# Patient Record
Sex: MALE | Race: WHITE | Employment: OTHER | URBAN - METROPOLITAN AREA
[De-identification: names, ages, dates, MRNs, and addresses within clinical notes are randomized per-mention and may not be internally consistent; named-entity substitution may affect disease eponyms.]

---

## 2021-09-13 ENCOUNTER — HOSPITAL ENCOUNTER (OUTPATIENT)
Dept: PREADMISSION TESTING | Age: 66
Discharge: HOME | End: 2021-09-13
Payer: MEDICARE

## 2021-09-13 VITALS
DIASTOLIC BLOOD PRESSURE: 90 MMHG | RESPIRATION RATE: 20 BRPM | BODY MASS INDEX: 27.1 KG/M2 | SYSTOLIC BLOOD PRESSURE: 174 MMHG | HEIGHT: 69 IN | WEIGHT: 182.98 LBS | TEMPERATURE: 98 F | HEART RATE: 76 BPM

## 2021-09-13 LAB
ABO + RH BLD: NORMAL
ANION GAP SERPL CALC-SCNC: 4 MMOL/L (ref 5–15)
APPEARANCE UR: CLEAR
BACTERIA URNS QL MICRO: NEGATIVE /HPF
BILIRUB UR QL: NEGATIVE
BLOOD GROUP ANTIBODIES SERPL: NORMAL
BUN SERPL-MCNC: 16 MG/DL (ref 6–20)
BUN/CREAT SERPL: 13 (ref 12–20)
CALCIUM SERPL-MCNC: 9.4 MG/DL (ref 8.5–10.1)
CHLORIDE SERPL-SCNC: 105 MMOL/L (ref 97–108)
CO2 SERPL-SCNC: 29 MMOL/L (ref 21–32)
COLOR UR: NORMAL
CREAT SERPL-MCNC: 1.19 MG/DL (ref 0.7–1.3)
EPITH CASTS URNS QL MICRO: NORMAL /LPF
ERYTHROCYTE [DISTWIDTH] IN BLOOD BY AUTOMATED COUNT: 13.3 % (ref 11.5–14.5)
EST. AVERAGE GLUCOSE BLD GHB EST-MCNC: 131 MG/DL
GLUCOSE SERPL-MCNC: 90 MG/DL (ref 65–100)
GLUCOSE UR STRIP.AUTO-MCNC: NEGATIVE MG/DL
HBA1C MFR BLD: 6.2 % (ref 4–5.6)
HCT VFR BLD AUTO: 46.9 % (ref 36.6–50.3)
HGB BLD-MCNC: 15.5 G/DL (ref 12.1–17)
HGB UR QL STRIP: NEGATIVE
HYALINE CASTS URNS QL MICRO: NORMAL /LPF (ref 0–5)
INR PPP: 1 (ref 0.9–1.1)
KETONES UR QL STRIP.AUTO: NEGATIVE MG/DL
LEUKOCYTE ESTERASE UR QL STRIP.AUTO: NEGATIVE
MCH RBC QN AUTO: 30.9 PG (ref 26–34)
MCHC RBC AUTO-ENTMCNC: 33 G/DL (ref 30–36.5)
MCV RBC AUTO: 93.6 FL (ref 80–99)
NITRITE UR QL STRIP.AUTO: NEGATIVE
NRBC # BLD: 0 K/UL (ref 0–0.01)
NRBC BLD-RTO: 0 PER 100 WBC
PH UR STRIP: 7 [PH] (ref 5–8)
PLATELET # BLD AUTO: 198 K/UL (ref 150–400)
PMV BLD AUTO: 11 FL (ref 8.9–12.9)
POTASSIUM SERPL-SCNC: 4.4 MMOL/L (ref 3.5–5.1)
PROT UR STRIP-MCNC: NEGATIVE MG/DL
PROTHROMBIN TIME: 10.3 SEC (ref 9–11.1)
RBC # BLD AUTO: 5.01 M/UL (ref 4.1–5.7)
RBC #/AREA URNS HPF: NORMAL /HPF (ref 0–5)
SODIUM SERPL-SCNC: 138 MMOL/L (ref 136–145)
SP GR UR REFRACTOMETRY: 1.01 (ref 1–1.03)
SPECIMEN EXP DATE BLD: NORMAL
UA: UC IF INDICATED,UAUC: NORMAL
UROBILINOGEN UR QL STRIP.AUTO: 0.2 EU/DL (ref 0.2–1)
WBC # BLD AUTO: 8.9 K/UL (ref 4.1–11.1)
WBC URNS QL MICRO: NORMAL /HPF (ref 0–4)

## 2021-09-13 PROCEDURE — 83036 HEMOGLOBIN GLYCOSYLATED A1C: CPT

## 2021-09-13 PROCEDURE — 93005 ELECTROCARDIOGRAM TRACING: CPT

## 2021-09-13 PROCEDURE — 81001 URINALYSIS AUTO W/SCOPE: CPT

## 2021-09-13 PROCEDURE — 36415 COLL VENOUS BLD VENIPUNCTURE: CPT

## 2021-09-13 PROCEDURE — 85027 COMPLETE CBC AUTOMATED: CPT

## 2021-09-13 PROCEDURE — 86901 BLOOD TYPING SEROLOGIC RH(D): CPT

## 2021-09-13 PROCEDURE — 85610 PROTHROMBIN TIME: CPT

## 2021-09-13 PROCEDURE — 80048 BASIC METABOLIC PNL TOTAL CA: CPT

## 2021-09-13 RX ORDER — LEVOTHYROXINE SODIUM 50 UG/1
50 TABLET ORAL
COMMUNITY

## 2021-09-13 RX ORDER — METFORMIN HYDROCHLORIDE 1000 MG/1
TABLET, FILM COATED, EXTENDED RELEASE ORAL
COMMUNITY
End: 2021-09-13

## 2021-09-13 RX ORDER — CARVEDILOL 12.5 MG/1
12.5 TABLET ORAL DAILY
COMMUNITY

## 2021-09-13 RX ORDER — CARVEDILOL 25 MG/1
25 TABLET ORAL
COMMUNITY

## 2021-09-13 RX ORDER — METFORMIN HYDROCHLORIDE 500 MG/1
1000 TABLET ORAL
COMMUNITY

## 2021-09-13 RX ORDER — AMLODIPINE BESYLATE 5 MG/1
5 TABLET ORAL DAILY
COMMUNITY

## 2021-09-13 RX ORDER — DOXAZOSIN 2 MG/1
2 TABLET ORAL
COMMUNITY

## 2021-09-13 RX ORDER — LISINOPRIL 20 MG/1
20 TABLET ORAL
Status: ON HOLD | COMMUNITY
End: 2021-09-20

## 2021-09-13 RX ORDER — ASPIRIN 81 MG/1
81 TABLET ORAL DAILY
COMMUNITY
End: 2021-09-21

## 2021-09-13 RX ORDER — LINAGLIPTIN 5 MG/1
5 TABLET, FILM COATED ORAL
COMMUNITY

## 2021-09-13 RX ORDER — LISINOPRIL 10 MG/1
10 TABLET ORAL DAILY
Status: ON HOLD | COMMUNITY
End: 2021-09-20

## 2021-09-13 RX ORDER — LISINOPRIL AND HYDROCHLOROTHIAZIDE 20; 25 MG/1; MG/1
1 TABLET ORAL
COMMUNITY

## 2021-09-13 NOTE — H&P
Cassandra Layton  : 1955  Single / Language: [place] / Race: White  Male      History of Present Illness  The patient is a 78year old male who presents to the practice today for a transition into care. Additional reasons for visit:    Hip Pain is described as the following: The onset of the hip pain has been gradual and has been occurring in a persistent pattern for 3 years. The course has been gradually worsening. The hip pain is described as a severe sharp stabbing. The hip pain is described as being located in the hip (right). The pain is aggravated by lifting, bending and squatting. Relieving factors include rest. Note for \"Hip Pain\": Chief complaint right hip pain. 3 years of hip pain. Progressively worsening. Pain in his groin. Loss of ability to get shoes and socks on. Patient is here today to schedule his hip replacement. He is seen 2 other doctors. Allergies   No Known Allergies   [2021]:  No Known Drug Allergies   [2021]: Allergies Reconciled      Family History  Cancer   Mother. Heart disease in male family member before age 54      Social History  Caffeine use   3-4 drinks per day, Coffee. Current work status   Unemployed, not looking for work. Exercise   3-4 times per week, walking. Marital status   Single. No alcohol use    Seat Belt Use   Always uses seat belts. Sun Exposure   Frequently. Tobacco / smoke exposure   None. Tobacco use   Current every day smoker, Smokes 1 pack of cigarettes per day. Medication History   amLODIPine Besylate  (5MG Tablet, Oral) Active. Aspirin  (81MG Tablet DR, Oral) Active. Carvedilol  (25MG Tablet, Oral) Active. Combivent Respimat  (20-100MCG/ACT Aerosol Soln, Inhalation) Active. Doxazosin Mesylate  (2MG Tablet, Oral) Active. FiberCon  (625MG Tablet, Oral) Active. Levothyroxine Sodium  (50MCG Tablet, Oral) Active. Lisinopril  (20MG Tablet, Oral) Active.   metFORMIN HCl  (1000MG Tablet, Oral) Active. Lisinopril-hydroCHLOROthiazide  (20-25MG Tablet, Oral) Active. Naproxen  (250MG Tablet, Oral) Active. Sildenafil Citrate  (100MG Tablet, Oral) Active. Tradjenta  (5MG Tablet, Oral) Active. Simvastatin  (5MG Tablet, Oral) Active. Medications Reconciled     Past Surgical History  No pertinent past surgical history      Other Problems   Arthritis    Asthma    Diabetes Mellitus    High blood pressure          Review of Systems  General Not Present- Appetite Loss, Chills, Fatigue, Fever, Night Sweats, Weight Gain and Weight Loss. Skin Not Present- Itching, Nail Changes, Poor Wound Healing, Rash, Skin Color Changes, Suspicious Lesions and Yellowish Skin Color. HEENT Not Present- Decreased Hearing, Earache, Hoarseness, Loose Teeth, Nose Bleed, Ringing in the Ears and Sore Throat. Respiratory Present- Wheezing. Not Present- Bloody sputum, Chronic Cough, Difficulty Breathing, Snoring and Wakes up from Sleep Wheezing or Short of Breath. Cardiovascular Present- Difficulty Breathing On Exertion. Not Present- Bluish Discoloration Of Lips Or Nails, Chest Pain, Difficulty Breathing Lying Down, Leg Cramps With Exertion, Palpitations and Swelling of Extremities. Musculoskeletal Present- Joint Pain. Not Present- Back Pain, Joint Stiffness and Joint Swelling. Neurological Not Present- Fainting, Headaches, Memory Loss, Numbness, Seizures, Tingling, Tremor, Unsteadiness and Weakness. Psychiatric Not Present- Anxiety, Bipolar and Depression. Endocrine Not Present- Cold Intolerance, Excessive Hunger, Excessive Thirst, Excessive Urination and Heat Intolerance. Hematology Present- Abnormal Bruising . Not Present- Enlarged Lymph Nodes, Excessive bleeding and Skin Discoloration. Vitals  Weight: 190 lb   Height: 69 in   Weight was reported by patient. Height was reported by patient.   Body Surface Area: 2.02 m²   Body Mass Index: 28.06 kg/m²      Physical Exam  Musculoskeletal  Global Assessment  Right Lower Extremity - Note: Hip was examined. Positive logroll test and positive impingement test. Hip extends fully flexes to 85 degrees. Very painful to any attempted rotation. Straight leg raise and femoral nerve stretch test are negative. Extremity is sensate and perfused with palpable dorsalis pedis pulse. Hip flexors, quads, ankle plantar flexors, ankle dorsiflexors have 5 out of 5 strength. Left Lower Extremity - Note: Patient's hip was examined. Impingement and apprehension signs are negative. Hip extends fully. Hip flexes to 100+ degrees. There is no trochanteric tenderness. Straight leg raise and femoral nerve stretch test are negative. Extremity is sensate and perfused with palpable dorsalis pedis pulse. Hip flexors, quads, ankle plantar flexors, ankle dorsiflexors have 5 out of 5 strength. Assessment & Plan     Primary osteoarthritis of right hip (715.15  M16.11)  Impression: Severe bone-on-bone DJD of the hip. Options include surgery versus pain management. He has agreed to quit smoking. Longstanding history. He scheduled a right total hip. Current Plans  Pt Education - How to [address] using Patient Portal and "[employer] Party Apps: discussed with patient and provided information. Pt Education - Educational materials were provided.: discussed with patient and provided information. EDUCATION ABOUT SMOKING CESSATION BY ONE ON ONE INSTRUCTION (4000F)  Pt Education - Smoking: Ways to Quit: smoking  X-RAY EXAM OF HIP COMPLETE min 2 VIEWS (57780) (AP Pelvis and Right Lateral hip views were taken today using Digital Radiography. Two views right hip.  Severe bone-on-bone from hip dysplasia with cysts and osteophytes.)    REVIEW OF SYSTEMS: Systems were reviewed by the provider.(V49.9)

## 2021-09-13 NOTE — PERIOP NOTES
Preoperative instructions reviewed with patient. Patient given CHG soap and instructions in use. Patient given SSI infection FAQ sheet. MRSA/MSSA treatment instruction sheet given with an explanation to patient that they will be notified if treatment instructions need to be initiated. Patient was given opportunity to ask questions on the information provided.

## 2021-09-14 ENCOUNTER — TRANSCRIBE ORDER (OUTPATIENT)
Dept: REGISTRATION | Age: 66
End: 2021-09-14

## 2021-09-14 DIAGNOSIS — Z01.812 PRE-PROCEDURE LAB EXAM: Primary | ICD-10-CM

## 2021-09-14 LAB
ATRIAL RATE: 60 BPM
CALCULATED P AXIS, ECG09: 56 DEGREES
CALCULATED R AXIS, ECG10: -36 DEGREES
CALCULATED T AXIS, ECG11: 64 DEGREES
DIAGNOSIS, 93000: NORMAL
P-R INTERVAL, ECG05: 148 MS
Q-T INTERVAL, ECG07: 422 MS
QRS DURATION, ECG06: 86 MS
QTC CALCULATION (BEZET), ECG08: 422 MS
VENTRICULAR RATE, ECG03: 60 BPM

## 2021-09-14 NOTE — PERIOP NOTES
PAT Nurse Practitioner   Pre-Operative Chart Review/Assessment:-ORTHOPEDIC                Patient Name:  Vasyl Murrieta                                                           Age:   78 y.o.    :  1955     Today's Date:  2021     Date of PAT:   2021      Date of Surgery:    2021      Procedure(s):  Right Total Hip Arthroplasty     Surgeon:   Dr. Deya Escalante                       PLAN:      1)  Medical Clearance:  Dr. Demetria Senior      2)  Cardiac Clearance:  EKG and METs reviewed. No further cardiac evaluation requested. 3)  Diabetic Treatment Consult:  Not indicated. A1c-6.2      4)  Sleep Apnea evaluation:   BRETT score of 5. Pt reports daytime fatigue and a diagnosis of HTN. Pt denies loud snoring that can be heard through a closed door and witnessed pauses in breathing. Pt refused referral to sleep medicine at this time. 5) Treatment for MRSA/Staph Aureus:  +MSSA. Tx w/ mupirocin. 6) Additional Concerns:  Current 0.75 PPD smoker, HTN, T2DM, Asthma                Vital Signs:         Vitals:    21 1121   BP: (!) 174/90   Pulse: 76   Resp: 20   Temp: 98 °F (36.7 °C)   Weight: 83 kg (182 lb 15.7 oz)   Height: 5' 9\" (1.753 m)            ____________________________________________  PAST MEDICAL HISTORY  Past Medical History:   Diagnosis Date    Arthritis     Asthma     Diabetes ([address]     Hypertension     MSSA (methicillin susceptible Staphylococcus aureus) 2021    nares      ____________________________________________  PAST SURGICAL HISTORY  Past Surgical History:   Procedure Laterality Date    HX GI      COLONOSCOPY      ____________________________________________  HOME MEDICATIONS  Current Outpatient Medications   Medication Sig    amLODIPine (NORVASC) 5 mg tablet Take 5 mg by mouth daily.  aspirin delayed-release 81 mg tablet Take 81 mg by mouth daily.  carvediloL (COREG) 25 mg tablet Take 25 mg by mouth nightly.     carvediloL (Coreg) 12.5 mg tablet Take 12.5 mg by mouth daily.  ipratropium-albuteroL (Combivent Respimat)  mcg/actuation inhaler Take 1 Puff by inhalation daily. AND AS NEEDED    doxazosin (CARDURA) 2 mg tablet Take 2 mg by mouth nightly.  levothyroxine (SYNTHROID) 50 mcg tablet Take 50 mcg by mouth nightly.  lisinopriL (PRINIVIL, ZESTRIL) 20 mg tablet Take 20 mg by mouth nightly.  lisinopriL (PRINIVIL, ZESTRIL) 10 mg tablet Take 10 mg by mouth daily.  lisinopril-hydroCHLOROthiazide (PRINZIDE, ZESTORETIC) 20-25 mg per tablet Take  by mouth nightly.  linaGLIPtin (Tradjenta) 5 mg tablet Take 5 mg by mouth nightly.  metFORMIN (GLUCOPHAGE) 500 mg tablet Take 1,000 mg by mouth nightly.  mupirocin (BACTROBAN) 2 % ointment by Both Nostrils route two (2) times a day for 7 days. Apply 0.25 g (small pea-sized amount) to both nostrils twice a day for seven days.      No current facility-administered medications for this encounter.      ____________________________________________  ALLERGIES  No Known Allergies   ____________________________________________  SOCIAL HISTORY  Social History     Tobacco Use    Smoking status: Current Every Day Smoker     Packs/day: 0.75     Years: 20.00     Pack years: 15.00    Smokeless tobacco: Never Used   Substance Use Topics    Alcohol use: Not Currently      ____________________________________________   Internal Administration   First Dose COVID-19, PFIZER, MRNA, LNP-S, PF, 30MCG/0.3ML DOSE  02/27/2021   Second Dose COVID-19, PFIZER, MRNA, LNP-S, PF, 30MCG/0.3ML DOSE  03/20/2021      Last COVID Lab SARS-CoV-2 ( )   Date Value   09/15/2021 Not detected        Labs:     Hospital Outpatient Visit on 09/13/2021   Component Date Value Ref Range Status    Sodium 09/13/2021 138  136 - 145 mmol/L Final    Potassium 09/13/2021 4.4  3.5 - 5.1 mmol/L Final    Chloride 09/13/2021 105  97 - 108 mmol/L Final    CO2 09/13/2021 29  21 - 32 mmol/L Final    Anion gap 09/13/2021 4* 5 - 15 mmol/L Final    Glucose 09/13/2021 90  65 - 100 mg/dL Final    BUN 09/13/2021 16  6 - 20 MG/DL Final    Creatinine 09/13/2021 1.19  0.70 - 1.30 MG/DL Final    BUN/Creatinine ratio 09/13/2021 13  12 - 20   Final    GFR est AA 09/13/2021 >60  >60 ml/min/1.73m2 Final    GFR est non-AA 09/13/2021 >60  >60 ml/min/1.73m2 Final    Estimated GFR is calculated using the IDMS-traceable Modification of Diet in Renal Disease (MDRD) Study equation, reported for both  Americans (GFRAA) and non- Americans (GFRNA), and normalized to 1.73m2 body surface area. The physician must decide which value applies to the patient.  Calcium 09/13/2021 9.4  8.5 - 10.1 MG/DL Final    WBC 09/13/2021 8.9  4.1 - 11.1 K/uL Final    RBC 09/13/2021 5.01  4.10 - 5.70 M/uL Final    HGB 09/13/2021 15.5  12.1 - 17.0 g/dL Final    HCT 09/13/2021 46.9  36.6 - 50.3 % Final    MCV 09/13/2021 93.6  80.0 - 99.0 FL Final    MCH 09/13/2021 30.9  26.0 - 34.0 PG Final    MCHC 09/13/2021 33.0  30.0 - 36.5 g/dL Final    RDW 09/13/2021 13.3  11.5 - 14.5 % Final    PLATELET 30/09/2960 232  150 - 400 K/uL Final    MPV 09/13/2021 11.0  8.9 - 12.9 FL Final    NRBC 09/13/2021 0.0  0  WBC Final    ABSOLUTE NRBC 09/13/2021 0.00  0.00 - 0.01 K/uL Final    Crossmatch Expiration 09/13/2021 09/23/2021,2359   Final    ABO/Rh(D) 09/13/2021 A POSITIVE   Final    Antibody screen 09/13/2021 NEG   Final    INR 09/13/2021 1.0  0.9 - 1.1   Final    A single therapeutic range for Vit K antagonists may not be optimal for all indications - see June, 2008 issue of Chest, American College of Chest Physicians Evidence-Based Clinical Practice Guidelines, 8th Edition.     Prothrombin time 09/13/2021 10.3  9.0 - 11.1 sec Final    Color 09/13/2021 YELLOW/STRAW    Final    Color Reference Range: Straw, Yellow or Dark Yellow    Appearance 09/13/2021 CLEAR  CLEAR   Final    Specific gravity 09/13/2021 1.010  1.003 - 1.030   Final    pH (UA) 09/13/2021 7.0 5.0 - 8.0   Final    Protein 09/13/2021 Negative  NEG mg/dL Final    Glucose 09/13/2021 Negative  NEG mg/dL Final    Ketone 09/13/2021 Negative  NEG mg/dL Final    Bilirubin 09/13/2021 Negative  NEG   Final    Blood 09/13/2021 Negative  NEG   Final    Urobilinogen 09/13/2021 0.2  0.2 - 1.0 EU/dL Final    Nitrites 09/13/2021 Negative  NEG   Final    Leukocyte Esterase 09/13/2021 Negative  NEG   Final    UA:UC IF INDICATED 09/13/2021 CULTURE NOT INDICATED BY UA RESULT  CNI   Final    WBC 09/13/2021 0-4  0 - 4 /hpf Final    RBC 09/13/2021 0-5  0 - 5 /hpf Final    Epithelial cells 09/13/2021 FEW  FEW /lpf Final    Epithelial cell category consists of squamous cells and /or transitional urothelial cells. Renal tubular cells, if present, are separately identified as such.     Bacteria 09/13/2021 Negative  NEG /hpf Final    Hyaline cast 09/13/2021 0-2  0 - 5 /lpf Final    Ventricular Rate 09/13/2021 60  BPM Final    Atrial Rate 09/13/2021 60  BPM Final    P-R Interval 09/13/2021 148  ms Final    QRS Duration 09/13/2021 86  ms Final    Q-T Interval 09/13/2021 422  ms Final    QTC Calculation (Bezet) 09/13/2021 422  ms Final    Calculated P Axis 09/13/2021 56  degrees Final    Calculated R Axis 09/13/2021 -36  degrees Final    Calculated T Axis 09/13/2021 64  degrees Final    Diagnosis 09/13/2021    Final                    Value:Sinus rhythm with premature atrial complexes with aberrant conduction  Left axis deviation  Abnormal ECG  No previous ECGs available  Confirmed by Dayan Weeks MD. ([id number]) on 9/14/2021 10:19:01 PM      Hemoglobin A1c 09/13/2021 6.2* 4.0 - 5.6 % Final    Comment: NEW METHOD  PLEASE NOTE NEW REFERENCE RANGE  (NOTE)  HbA1C Interpretive Ranges  <5.7              Normal  5.7 - 6.4         Consider Prediabetes  >6.5              Consider Diabetes      Est. average glucose 09/13/2021 131  mg/dL Final    Special Requests: 09/13/2021 NO SPECIAL REQUESTS    Final    Culture result: 09/13/2021 MRSA NOT PRESENT. Apparent Staphylococus aureus (not MRSA noted). Final       Skin:     Denies open wounds, cuts, sores, rashes or other areas of concern in PAT assessment.           Cha Baig NP

## 2021-09-15 ENCOUNTER — HOSPITAL ENCOUNTER (OUTPATIENT)
Dept: PREADMISSION TESTING | Age: 66
Discharge: HOME | End: 2021-09-15
Payer: MEDICARE

## 2021-09-15 DIAGNOSIS — Z01.812 PRE-PROCEDURE LAB EXAM: ICD-10-CM

## 2021-09-15 LAB
BACTERIA SPEC CULT: NORMAL
BACTERIA SPEC CULT: NORMAL
SERVICE CMNT-IMP: NORMAL

## 2021-09-15 PROCEDURE — U0005 INFEC AGEN DETEC AMPLI PROBE: HCPCS

## 2021-09-16 LAB
SARS-COV-2, XPLCVT: NOT DETECTED
SOURCE, COVRS: NORMAL

## 2021-09-16 RX ORDER — MUPIROCIN 20 MG/G
OINTMENT TOPICAL 2 TIMES DAILY
Qty: 22 G | Refills: 0 | Status: SHIPPED | OUTPATIENT
Start: 2021-09-16 | End: 2021-09-23

## 2021-09-16 NOTE — PERIOP NOTES
PC to pt, full name and  verified, regarding positive nasal cx (MSSA) and need to start Mupirocin ointment BID x 7 days to B nostrils starting today and bathe with CHG soap for 7 days prior to surgery. Pt verbalized understanding of instructions and will start today as recommended. Allergies and pharmacy of choice reviewed. Rx escribed to pt's pharmacy of choice. PTA medlist updated. Surgeon and PCP notified of positive culture and treatment. PRESCRIPTION:    MUPIROCIN 2% OINTMENT  QUANTITY:  #22 GRAMS  REFILLS: NONE    Apply 0.25 g (small pea-sized amount) to both nostrils twice a day for seven days.     Mckinley Dillard, NP

## 2021-09-17 ENCOUNTER — ANESTHESIA EVENT (OUTPATIENT)
Dept: SURGERY | Age: 66
End: 2021-09-17
Payer: MEDICARE

## 2021-09-20 ENCOUNTER — ANESTHESIA (OUTPATIENT)
Dept: SURGERY | Age: 66
End: 2021-09-20
Payer: MEDICARE

## 2021-09-20 ENCOUNTER — HOSPITAL ENCOUNTER (OUTPATIENT)
Age: 66
Discharge: HOME | End: 2021-09-21
Attending: ORTHOPAEDIC SURGERY | Admitting: ORTHOPAEDIC SURGERY
Payer: MEDICARE

## 2021-09-20 ENCOUNTER — APPOINTMENT (OUTPATIENT)
Dept: GENERAL RADIOLOGY | Age: 66
End: 2021-09-20
Attending: ORTHOPAEDIC SURGERY
Payer: MEDICARE

## 2021-09-20 DIAGNOSIS — M16.11 PRIMARY LOCALIZED OSTEOARTHRITIS OF RIGHT HIP: Primary | ICD-10-CM

## 2021-09-20 LAB
GLUCOSE BLD STRIP.AUTO-MCNC: 101 MG/DL (ref 65–117)
GLUCOSE BLD STRIP.AUTO-MCNC: 125 MG/DL (ref 65–117)
GLUCOSE BLD STRIP.AUTO-MCNC: 137 MG/DL (ref 65–117)
GLUCOSE BLD STRIP.AUTO-MCNC: 164 MG/DL (ref 65–117)
SERVICE CMNT-IMP: ABNORMAL
SERVICE CMNT-IMP: NORMAL

## 2021-09-20 PROCEDURE — 74011000250 HC RX REV CODE- 250: Performed by: ORTHOPAEDIC SURGERY

## 2021-09-20 PROCEDURE — 76060000035 HC ANESTHESIA 2 TO 2.5 HR: Performed by: ORTHOPAEDIC SURGERY

## 2021-09-20 PROCEDURE — 77030026438 HC STYL ET INTUB CARD -A: Performed by: NURSE ANESTHETIST, CERTIFIED REGISTERED

## 2021-09-20 PROCEDURE — 2709999900 HC NON-CHARGEABLE SUPPLY: Performed by: ORTHOPAEDIC SURGERY

## 2021-09-20 PROCEDURE — 74011250636 HC RX REV CODE- 250/636: Performed by: ORTHOPAEDIC SURGERY

## 2021-09-20 PROCEDURE — 74011250637 HC RX REV CODE- 250/637: Performed by: ORTHOPAEDIC SURGERY

## 2021-09-20 PROCEDURE — 76010000171 HC OR TIME 2 TO 2.5 HR INTENSV-TIER 1: Performed by: ORTHOPAEDIC SURGERY

## 2021-09-20 PROCEDURE — C1776 JOINT DEVICE (IMPLANTABLE): HCPCS | Performed by: ORTHOPAEDIC SURGERY

## 2021-09-20 PROCEDURE — 97161 PT EVAL LOW COMPLEX 20 MIN: CPT

## 2021-09-20 PROCEDURE — 77030020800 HC BIT DRL QC ZIMM -B: Performed by: ORTHOPAEDIC SURGERY

## 2021-09-20 PROCEDURE — 74011250636 HC RX REV CODE- 250/636: Performed by: NURSE ANESTHETIST, CERTIFIED REGISTERED

## 2021-09-20 PROCEDURE — 74011250637 HC RX REV CODE- 250/637: Performed by: ANESTHESIOLOGY

## 2021-09-20 PROCEDURE — 74011250637 HC RX REV CODE- 250/637: Performed by: PHYSICIAN ASSISTANT

## 2021-09-20 PROCEDURE — 74011000250 HC RX REV CODE- 250: Performed by: NURSE ANESTHETIST, CERTIFIED REGISTERED

## 2021-09-20 PROCEDURE — 82962 GLUCOSE BLOOD TEST: CPT

## 2021-09-20 PROCEDURE — 74011000250 HC RX REV CODE- 250: Performed by: PHYSICIAN ASSISTANT

## 2021-09-20 PROCEDURE — 77030006822 HC BLD SAW SAG BRSM -B: Performed by: ORTHOPAEDIC SURGERY

## 2021-09-20 PROCEDURE — 77030003756 HC BIT DRL DISP ZIMM -B: Performed by: ORTHOPAEDIC SURGERY

## 2021-09-20 PROCEDURE — 74011250636 HC RX REV CODE- 250/636: Performed by: PHYSICIAN ASSISTANT

## 2021-09-20 PROCEDURE — 97116 GAIT TRAINING THERAPY: CPT

## 2021-09-20 PROCEDURE — 73501 X-RAY EXAM HIP UNI 1 VIEW: CPT

## 2021-09-20 PROCEDURE — 74011250636 HC RX REV CODE- 250/636: Performed by: ANESTHESIOLOGY

## 2021-09-20 PROCEDURE — 77030031139 HC SUT VCRL2 J&J -A: Performed by: ORTHOPAEDIC SURGERY

## 2021-09-20 PROCEDURE — 77030039267 HC ADH SKN EXOFIN S2SG -B: Performed by: ORTHOPAEDIC SURGERY

## 2021-09-20 PROCEDURE — 77030002933 HC SUT MCRYL J&J -A: Performed by: ORTHOPAEDIC SURGERY

## 2021-09-20 PROCEDURE — C9290 INJ, BUPIVACAINE LIPOSOME: HCPCS | Performed by: ORTHOPAEDIC SURGERY

## 2021-09-20 PROCEDURE — 76210000001 HC OR PH I REC 2.5 TO 3 HR: Performed by: ORTHOPAEDIC SURGERY

## 2021-09-20 PROCEDURE — 97530 THERAPEUTIC ACTIVITIES: CPT

## 2021-09-20 PROCEDURE — 77030020788: Performed by: ORTHOPAEDIC SURGERY

## 2021-09-20 PROCEDURE — 77030008684 HC TU ET CUF COVD -B: Performed by: NURSE ANESTHETIST, CERTIFIED REGISTERED

## 2021-09-20 PROCEDURE — 74011000258 HC RX REV CODE- 258: Performed by: ORTHOPAEDIC SURGERY

## 2021-09-20 DEVICE — BIOLOX® DELTA, CERAMIC FEMORAL HEAD, XL, Ø 36/+7, TAPER 12/14
Type: IMPLANTABLE DEVICE | Site: HIP | Status: FUNCTIONAL
Brand: BIOLOX® DELTA

## 2021-09-20 DEVICE — IMPLANTABLE DEVICE
Type: IMPLANTABLE DEVICE | Site: HIP | Status: FUNCTIONAL
Brand: G7® ACETABULAR SYSTEM

## 2021-09-20 DEVICE — IMPLANTABLE DEVICE
Type: IMPLANTABLE DEVICE | Site: HIP | Status: FUNCTIONAL
Brand: G7® VIVACIT-E®

## 2021-09-20 DEVICE — IMPLANTABLE DEVICE: Type: IMPLANTABLE DEVICE | Site: HIP | Status: FUNCTIONAL

## 2021-09-20 DEVICE — IMPLANTABLE DEVICE
Type: IMPLANTABLE DEVICE | Site: HIP | Status: FUNCTIONAL
Brand: AVENIR COMPLETE™

## 2021-09-20 DEVICE — HIP H2 ADV OTHER HD IMPL CAPPED H2: Type: IMPLANTABLE DEVICE | Site: HIP | Status: FUNCTIONAL

## 2021-09-20 RX ORDER — SODIUM CHLORIDE, SODIUM LACTATE, POTASSIUM CHLORIDE, CALCIUM CHLORIDE 600; 310; 30; 20 MG/100ML; MG/100ML; MG/100ML; MG/100ML
125 INJECTION, SOLUTION INTRAVENOUS CONTINUOUS
Status: DISCONTINUED | OUTPATIENT
Start: 2021-09-20 | End: 2021-09-20 | Stop reason: HOSPADM

## 2021-09-20 RX ORDER — TRANEXAMIC ACID 100 MG/ML
INJECTION, SOLUTION INTRAVENOUS AS NEEDED
Status: DISCONTINUED | OUTPATIENT
Start: 2021-09-20 | End: 2021-09-20 | Stop reason: HOSPADM

## 2021-09-20 RX ORDER — HYDROXYZINE HYDROCHLORIDE 10 MG/1
10 TABLET, FILM COATED ORAL
Status: DISCONTINUED | OUTPATIENT
Start: 2021-09-20 | End: 2021-09-21 | Stop reason: HOSPADM

## 2021-09-20 RX ORDER — MORPHINE SULFATE 2 MG/ML
2 INJECTION, SOLUTION INTRAMUSCULAR; INTRAVENOUS
Status: DISCONTINUED | OUTPATIENT
Start: 2021-09-20 | End: 2021-09-20 | Stop reason: HOSPADM

## 2021-09-20 RX ORDER — SUCCINYLCHOLINE CHLORIDE 20 MG/ML
INJECTION INTRAMUSCULAR; INTRAVENOUS AS NEEDED
Status: DISCONTINUED | OUTPATIENT
Start: 2021-09-20 | End: 2021-09-20 | Stop reason: HOSPADM

## 2021-09-20 RX ORDER — LISINOPRIL 10 MG/1
10 TABLET ORAL DAILY
Status: DISCONTINUED | OUTPATIENT
Start: 2021-09-21 | End: 2021-09-21 | Stop reason: HOSPADM

## 2021-09-20 RX ORDER — DEXAMETHASONE SODIUM PHOSPHATE 4 MG/ML
INJECTION, SOLUTION INTRA-ARTICULAR; INTRALESIONAL; INTRAMUSCULAR; INTRAVENOUS; SOFT TISSUE AS NEEDED
Status: DISCONTINUED | OUTPATIENT
Start: 2021-09-20 | End: 2021-09-20 | Stop reason: HOSPADM

## 2021-09-20 RX ORDER — DOXAZOSIN 2 MG/1
2 TABLET ORAL
Status: DISCONTINUED | OUTPATIENT
Start: 2021-09-20 | End: 2021-09-21 | Stop reason: HOSPADM

## 2021-09-20 RX ORDER — LIDOCAINE HYDROCHLORIDE 20 MG/ML
INJECTION, SOLUTION EPIDURAL; INFILTRATION; INTRACAUDAL; PERINEURAL AS NEEDED
Status: DISCONTINUED | OUTPATIENT
Start: 2021-09-20 | End: 2021-09-20 | Stop reason: HOSPADM

## 2021-09-20 RX ORDER — HYDROMORPHONE HYDROCHLORIDE 1 MG/ML
1 INJECTION, SOLUTION INTRAMUSCULAR; INTRAVENOUS; SUBCUTANEOUS
Status: DISCONTINUED | OUTPATIENT
Start: 2021-09-20 | End: 2021-09-21 | Stop reason: HOSPADM

## 2021-09-20 RX ORDER — KETAMINE HYDROCHLORIDE 10 MG/ML
INJECTION, SOLUTION INTRAMUSCULAR; INTRAVENOUS AS NEEDED
Status: DISCONTINUED | OUTPATIENT
Start: 2021-09-20 | End: 2021-09-20 | Stop reason: HOSPADM

## 2021-09-20 RX ORDER — OXYCODONE HYDROCHLORIDE 5 MG/1
10 TABLET ORAL
Status: DISCONTINUED | OUTPATIENT
Start: 2021-09-20 | End: 2021-09-21 | Stop reason: HOSPADM

## 2021-09-20 RX ORDER — IBUPROFEN 200 MG
1 TABLET ORAL DAILY
Status: DISCONTINUED | OUTPATIENT
Start: 2021-09-20 | End: 2021-09-21 | Stop reason: HOSPADM

## 2021-09-20 RX ORDER — ACETAMINOPHEN 325 MG/1
650 TABLET ORAL ONCE
Status: COMPLETED | OUTPATIENT
Start: 2021-09-20 | End: 2021-09-20

## 2021-09-20 RX ORDER — LEVOTHYROXINE SODIUM 50 UG/1
50 TABLET ORAL
Status: DISCONTINUED | OUTPATIENT
Start: 2021-09-20 | End: 2021-09-21 | Stop reason: HOSPADM

## 2021-09-20 RX ORDER — IPRATROPIUM BROMIDE AND ALBUTEROL SULFATE 2.5; .5 MG/3ML; MG/3ML
3 SOLUTION RESPIRATORY (INHALATION) DAILY
Status: DISCONTINUED | OUTPATIENT
Start: 2021-09-21 | End: 2021-09-21 | Stop reason: HOSPADM

## 2021-09-20 RX ORDER — LISINOPRIL AND HYDROCHLOROTHIAZIDE 20; 25 MG/1; MG/1
0.5 TABLET ORAL DAILY
COMMUNITY

## 2021-09-20 RX ORDER — ONDANSETRON 2 MG/ML
INJECTION INTRAMUSCULAR; INTRAVENOUS AS NEEDED
Status: DISCONTINUED | OUTPATIENT
Start: 2021-09-20 | End: 2021-09-20 | Stop reason: HOSPADM

## 2021-09-20 RX ORDER — MIDAZOLAM HYDROCHLORIDE 1 MG/ML
1 INJECTION, SOLUTION INTRAMUSCULAR; INTRAVENOUS
Status: DISCONTINUED | OUTPATIENT
Start: 2021-09-20 | End: 2021-09-20 | Stop reason: HOSPADM

## 2021-09-20 RX ORDER — MAGNESIUM SULFATE 100 %
4 CRYSTALS MISCELLANEOUS AS NEEDED
Status: DISCONTINUED | OUTPATIENT
Start: 2021-09-20 | End: 2021-09-21 | Stop reason: HOSPADM

## 2021-09-20 RX ORDER — ROCURONIUM BROMIDE 10 MG/ML
INJECTION, SOLUTION INTRAVENOUS AS NEEDED
Status: DISCONTINUED | OUTPATIENT
Start: 2021-09-20 | End: 2021-09-20 | Stop reason: HOSPADM

## 2021-09-20 RX ORDER — SODIUM CHLORIDE 0.9 % (FLUSH) 0.9 %
5-40 SYRINGE (ML) INJECTION EVERY 8 HOURS
Status: DISCONTINUED | OUTPATIENT
Start: 2021-09-20 | End: 2021-09-21 | Stop reason: HOSPADM

## 2021-09-20 RX ORDER — NALOXONE HYDROCHLORIDE 0.4 MG/ML
0.4 INJECTION, SOLUTION INTRAMUSCULAR; INTRAVENOUS; SUBCUTANEOUS AS NEEDED
Status: DISCONTINUED | OUTPATIENT
Start: 2021-09-20 | End: 2021-09-21 | Stop reason: HOSPADM

## 2021-09-20 RX ORDER — FENTANYL CITRATE 50 UG/ML
25 INJECTION, SOLUTION INTRAMUSCULAR; INTRAVENOUS
Status: DISCONTINUED | OUTPATIENT
Start: 2021-09-20 | End: 2021-09-20 | Stop reason: HOSPADM

## 2021-09-20 RX ORDER — DEXTROSE 50 % IN WATER (D50W) INTRAVENOUS SYRINGE
25-50 AS NEEDED
Status: DISCONTINUED | OUTPATIENT
Start: 2021-09-20 | End: 2021-09-21 | Stop reason: HOSPADM

## 2021-09-20 RX ORDER — SODIUM CHLORIDE 0.9 % (FLUSH) 0.9 %
5-40 SYRINGE (ML) INJECTION AS NEEDED
Status: DISCONTINUED | OUTPATIENT
Start: 2021-09-20 | End: 2021-09-21 | Stop reason: HOSPADM

## 2021-09-20 RX ORDER — HYDROCHLOROTHIAZIDE 25 MG/1
12.5 TABLET ORAL DAILY
Status: DISCONTINUED | OUTPATIENT
Start: 2021-09-21 | End: 2021-09-21 | Stop reason: HOSPADM

## 2021-09-20 RX ORDER — CARVEDILOL 12.5 MG/1
25 TABLET ORAL
Status: DISCONTINUED | OUTPATIENT
Start: 2021-09-20 | End: 2021-09-21 | Stop reason: HOSPADM

## 2021-09-20 RX ORDER — SODIUM CHLORIDE 0.9 % (FLUSH) 0.9 %
5-40 SYRINGE (ML) INJECTION EVERY 8 HOURS
Status: DISCONTINUED | OUTPATIENT
Start: 2021-09-20 | End: 2021-09-20 | Stop reason: HOSPADM

## 2021-09-20 RX ORDER — LIDOCAINE HYDROCHLORIDE 10 MG/ML
0.5 INJECTION, SOLUTION EPIDURAL; INFILTRATION; INTRACAUDAL; PERINEURAL AS NEEDED
Status: DISCONTINUED | OUTPATIENT
Start: 2021-09-20 | End: 2021-09-20 | Stop reason: HOSPADM

## 2021-09-20 RX ORDER — ASPIRIN 325 MG
325 TABLET, DELAYED RELEASE (ENTERIC COATED) ORAL EVERY 12 HOURS
Status: DISCONTINUED | OUTPATIENT
Start: 2021-09-20 | End: 2021-09-21 | Stop reason: HOSPADM

## 2021-09-20 RX ORDER — NEOSTIGMINE METHYLSULFATE 1 MG/ML
INJECTION, SOLUTION INTRAVENOUS AS NEEDED
Status: DISCONTINUED | OUTPATIENT
Start: 2021-09-20 | End: 2021-09-20 | Stop reason: HOSPADM

## 2021-09-20 RX ORDER — INSULIN GLARGINE 100 [IU]/ML
0.2 INJECTION, SOLUTION SUBCUTANEOUS DAILY
Status: DISCONTINUED | OUTPATIENT
Start: 2021-09-20 | End: 2021-09-20

## 2021-09-20 RX ORDER — SODIUM CHLORIDE 9 MG/ML
125 INJECTION, SOLUTION INTRAVENOUS CONTINUOUS
Status: DISCONTINUED | OUTPATIENT
Start: 2021-09-20 | End: 2021-09-21 | Stop reason: HOSPADM

## 2021-09-20 RX ORDER — MIDAZOLAM HYDROCHLORIDE 1 MG/ML
INJECTION, SOLUTION INTRAMUSCULAR; INTRAVENOUS AS NEEDED
Status: DISCONTINUED | OUTPATIENT
Start: 2021-09-20 | End: 2021-09-20 | Stop reason: HOSPADM

## 2021-09-20 RX ORDER — LISINOPRIL 10 MG/1
20 TABLET ORAL
Status: DISCONTINUED | OUTPATIENT
Start: 2021-09-20 | End: 2021-09-20 | Stop reason: DRUGHIGH

## 2021-09-20 RX ORDER — EPHEDRINE SULFATE/0.9% NACL/PF 50 MG/5 ML
SYRINGE (ML) INTRAVENOUS AS NEEDED
Status: DISCONTINUED | OUTPATIENT
Start: 2021-09-20 | End: 2021-09-20 | Stop reason: HOSPADM

## 2021-09-20 RX ORDER — DIPHENHYDRAMINE HYDROCHLORIDE 50 MG/ML
12.5 INJECTION, SOLUTION INTRAMUSCULAR; INTRAVENOUS AS NEEDED
Status: DISCONTINUED | OUTPATIENT
Start: 2021-09-20 | End: 2021-09-20 | Stop reason: HOSPADM

## 2021-09-20 RX ORDER — ASPIRIN 325 MG
325 TABLET, DELAYED RELEASE (ENTERIC COATED) ORAL 2 TIMES DAILY
Status: DISCONTINUED | OUTPATIENT
Start: 2021-09-20 | End: 2021-09-20

## 2021-09-20 RX ORDER — HYDROCHLOROTHIAZIDE 25 MG/1
25 TABLET ORAL
Status: DISCONTINUED | OUTPATIENT
Start: 2021-09-20 | End: 2021-09-21 | Stop reason: HOSPADM

## 2021-09-20 RX ORDER — FACIAL-BODY WIPES
10 EACH TOPICAL DAILY PRN
Status: DISCONTINUED | OUTPATIENT
Start: 2021-09-22 | End: 2021-09-21 | Stop reason: HOSPADM

## 2021-09-20 RX ORDER — DEXTROSE, SODIUM CHLORIDE, SODIUM LACTATE, POTASSIUM CHLORIDE, AND CALCIUM CHLORIDE 5; .6; .31; .03; .02 G/100ML; G/100ML; G/100ML; G/100ML; G/100ML
125 INJECTION, SOLUTION INTRAVENOUS CONTINUOUS
Status: DISCONTINUED | OUTPATIENT
Start: 2021-09-20 | End: 2021-09-20 | Stop reason: HOSPADM

## 2021-09-20 RX ORDER — CARVEDILOL 12.5 MG/1
12.5 TABLET ORAL DAILY
Status: DISCONTINUED | OUTPATIENT
Start: 2021-09-21 | End: 2021-09-21 | Stop reason: HOSPADM

## 2021-09-20 RX ORDER — MUPIROCIN 20 MG/G
OINTMENT TOPICAL 2 TIMES DAILY
Status: DISCONTINUED | OUTPATIENT
Start: 2021-09-20 | End: 2021-09-21 | Stop reason: HOSPADM

## 2021-09-20 RX ORDER — LISINOPRIL AND HYDROCHLOROTHIAZIDE 20; 25 MG/1; MG/1
1 TABLET ORAL
Status: DISCONTINUED | OUTPATIENT
Start: 2021-09-20 | End: 2021-09-20 | Stop reason: SDUPTHER

## 2021-09-20 RX ORDER — INSULIN LISPRO 100 [IU]/ML
INJECTION, SOLUTION INTRAVENOUS; SUBCUTANEOUS
Status: DISCONTINUED | OUTPATIENT
Start: 2021-09-20 | End: 2021-09-21 | Stop reason: HOSPADM

## 2021-09-20 RX ORDER — OXYCODONE HYDROCHLORIDE 5 MG/1
5 TABLET ORAL AS NEEDED
Status: DISCONTINUED | OUTPATIENT
Start: 2021-09-20 | End: 2021-09-20 | Stop reason: HOSPADM

## 2021-09-20 RX ORDER — MIDAZOLAM HYDROCHLORIDE 1 MG/ML
1 INJECTION, SOLUTION INTRAMUSCULAR; INTRAVENOUS AS NEEDED
Status: DISCONTINUED | OUTPATIENT
Start: 2021-09-20 | End: 2021-09-20 | Stop reason: HOSPADM

## 2021-09-20 RX ORDER — OXYCODONE HYDROCHLORIDE 5 MG/1
5 TABLET ORAL
Status: DISCONTINUED | OUTPATIENT
Start: 2021-09-20 | End: 2021-09-21 | Stop reason: HOSPADM

## 2021-09-20 RX ORDER — FENTANYL CITRATE 50 UG/ML
INJECTION, SOLUTION INTRAMUSCULAR; INTRAVENOUS AS NEEDED
Status: DISCONTINUED | OUTPATIENT
Start: 2021-09-20 | End: 2021-09-20 | Stop reason: HOSPADM

## 2021-09-20 RX ORDER — LISINOPRIL 10 MG/1
10 TABLET ORAL DAILY
Status: DISCONTINUED | OUTPATIENT
Start: 2021-09-21 | End: 2021-09-20 | Stop reason: DRUGHIGH

## 2021-09-20 RX ORDER — GLYCOPYRROLATE 0.2 MG/ML
INJECTION INTRAMUSCULAR; INTRAVENOUS AS NEEDED
Status: DISCONTINUED | OUTPATIENT
Start: 2021-09-20 | End: 2021-09-20 | Stop reason: HOSPADM

## 2021-09-20 RX ORDER — ONDANSETRON 2 MG/ML
4 INJECTION INTRAMUSCULAR; INTRAVENOUS
Status: DISCONTINUED | OUTPATIENT
Start: 2021-09-20 | End: 2021-09-21 | Stop reason: HOSPADM

## 2021-09-20 RX ORDER — HYDROMORPHONE HYDROCHLORIDE 2 MG/ML
INJECTION, SOLUTION INTRAMUSCULAR; INTRAVENOUS; SUBCUTANEOUS AS NEEDED
Status: DISCONTINUED | OUTPATIENT
Start: 2021-09-20 | End: 2021-09-20 | Stop reason: HOSPADM

## 2021-09-20 RX ORDER — AMLODIPINE BESYLATE 5 MG/1
5 TABLET ORAL DAILY
Status: DISCONTINUED | OUTPATIENT
Start: 2021-09-21 | End: 2021-09-21 | Stop reason: HOSPADM

## 2021-09-20 RX ORDER — PROPOFOL 10 MG/ML
INJECTION, EMULSION INTRAVENOUS AS NEEDED
Status: DISCONTINUED | OUTPATIENT
Start: 2021-09-20 | End: 2021-09-20 | Stop reason: HOSPADM

## 2021-09-20 RX ORDER — LISINOPRIL 10 MG/1
20 TABLET ORAL
Status: DISCONTINUED | OUTPATIENT
Start: 2021-09-20 | End: 2021-09-21 | Stop reason: HOSPADM

## 2021-09-20 RX ORDER — ONDANSETRON 2 MG/ML
4 INJECTION INTRAMUSCULAR; INTRAVENOUS AS NEEDED
Status: DISCONTINUED | OUTPATIENT
Start: 2021-09-20 | End: 2021-09-20 | Stop reason: HOSPADM

## 2021-09-20 RX ORDER — POLYETHYLENE GLYCOL 3350 17 G/17G
17 POWDER, FOR SOLUTION ORAL DAILY
Status: DISCONTINUED | OUTPATIENT
Start: 2021-09-21 | End: 2021-09-21 | Stop reason: HOSPADM

## 2021-09-20 RX ORDER — AMOXICILLIN 250 MG
1 CAPSULE ORAL 2 TIMES DAILY
Status: DISCONTINUED | OUTPATIENT
Start: 2021-09-20 | End: 2021-09-21 | Stop reason: HOSPADM

## 2021-09-20 RX ORDER — SODIUM CHLORIDE 0.9 % (FLUSH) 0.9 %
5-40 SYRINGE (ML) INJECTION AS NEEDED
Status: DISCONTINUED | OUTPATIENT
Start: 2021-09-20 | End: 2021-09-20 | Stop reason: HOSPADM

## 2021-09-20 RX ORDER — FENTANYL CITRATE 50 UG/ML
50 INJECTION, SOLUTION INTRAMUSCULAR; INTRAVENOUS AS NEEDED
Status: DISCONTINUED | OUTPATIENT
Start: 2021-09-20 | End: 2021-09-20 | Stop reason: HOSPADM

## 2021-09-20 RX ORDER — ACETAMINOPHEN 500 MG
1000 TABLET ORAL EVERY 6 HOURS
Status: DISCONTINUED | OUTPATIENT
Start: 2021-09-20 | End: 2021-09-21 | Stop reason: HOSPADM

## 2021-09-20 RX ADMIN — ACETAMINOPHEN 1000 MG: 500 TABLET ORAL at 15:43

## 2021-09-20 RX ADMIN — LEVOTHYROXINE SODIUM 50 MCG: 0.05 TABLET ORAL at 21:05

## 2021-09-20 RX ADMIN — SODIUM CHLORIDE 125 ML/HR: 9 INJECTION, SOLUTION INTRAVENOUS at 12:09

## 2021-09-20 RX ADMIN — Medication 10 MG: at 09:38

## 2021-09-20 RX ADMIN — KETAMINE HYDROCHLORIDE 30 MG: 10 INJECTION, SOLUTION INTRAMUSCULAR; INTRAVENOUS at 09:48

## 2021-09-20 RX ADMIN — MUPIROCIN: 20 OINTMENT TOPICAL at 17:52

## 2021-09-20 RX ADMIN — FENTANYL CITRATE 25 MCG: 50 INJECTION INTRAMUSCULAR; INTRAVENOUS at 13:41

## 2021-09-20 RX ADMIN — OXYCODONE 10 MG: 5 TABLET ORAL at 21:05

## 2021-09-20 RX ADMIN — WATER 2 G: 1 INJECTION INTRAMUSCULAR; INTRAVENOUS; SUBCUTANEOUS at 09:25

## 2021-09-20 RX ADMIN — ROCURONIUM BROMIDE 10 MG: 10 SOLUTION INTRAVENOUS at 09:08

## 2021-09-20 RX ADMIN — PROPOFOL 150 MG: 10 INJECTION, EMULSION INTRAVENOUS at 09:08

## 2021-09-20 RX ADMIN — Medication 10 MG: at 09:25

## 2021-09-20 RX ADMIN — LISINOPRIL 20 MG: 10 TABLET ORAL at 21:05

## 2021-09-20 RX ADMIN — HYDROMORPHONE HYDROCHLORIDE 0.5 MG: 2 INJECTION, SOLUTION INTRAMUSCULAR; INTRAVENOUS; SUBCUTANEOUS at 10:31

## 2021-09-20 RX ADMIN — WATER 2 G: 1 INJECTION INTRAMUSCULAR; INTRAVENOUS; SUBCUTANEOUS at 16:56

## 2021-09-20 RX ADMIN — CARVEDILOL 25 MG: 12.5 TABLET, FILM COATED ORAL at 21:05

## 2021-09-20 RX ADMIN — HYDROCHLOROTHIAZIDE 25 MG: 25 TABLET ORAL at 21:05

## 2021-09-20 RX ADMIN — DOCUSATE SODIUM 50 MG AND SENNOSIDES 8.6 MG 1 TABLET: 8.6; 5 TABLET, FILM COATED ORAL at 17:51

## 2021-09-20 RX ADMIN — ONDANSETRON HYDROCHLORIDE 4 MG: 2 INJECTION, SOLUTION INTRAMUSCULAR; INTRAVENOUS at 09:28

## 2021-09-20 RX ADMIN — HYDROMORPHONE HYDROCHLORIDE 0.5 MG: 2 INJECTION, SOLUTION INTRAMUSCULAR; INTRAVENOUS; SUBCUTANEOUS at 11:00

## 2021-09-20 RX ADMIN — FENTANYL CITRATE 25 MCG: 50 INJECTION INTRAMUSCULAR; INTRAVENOUS at 12:33

## 2021-09-20 RX ADMIN — PROPOFOL 25 MG: 10 INJECTION, EMULSION INTRAVENOUS at 11:08

## 2021-09-20 RX ADMIN — SUCCINYLCHOLINE CHLORIDE 160 MG: 20 INJECTION, SOLUTION INTRAMUSCULAR; INTRAVENOUS at 09:08

## 2021-09-20 RX ADMIN — Medication 10 MG: at 10:50

## 2021-09-20 RX ADMIN — FENTANYL CITRATE 50 MCG: 50 INJECTION, SOLUTION INTRAMUSCULAR; INTRAVENOUS at 09:08

## 2021-09-20 RX ADMIN — ACETAMINOPHEN 650 MG: 325 TABLET ORAL at 08:10

## 2021-09-20 RX ADMIN — DEXAMETHASONE SODIUM PHOSPHATE 4 MG: 4 INJECTION, SOLUTION INTRAMUSCULAR; INTRAVENOUS at 09:28

## 2021-09-20 RX ADMIN — PROPOFOL 25 MG: 10 INJECTION, EMULSION INTRAVENOUS at 11:16

## 2021-09-20 RX ADMIN — KETAMINE HYDROCHLORIDE 20 MG: 10 INJECTION, SOLUTION INTRAMUSCULAR; INTRAVENOUS at 10:59

## 2021-09-20 RX ADMIN — MIDAZOLAM 2 MG: 1 INJECTION INTRAMUSCULAR; INTRAVENOUS at 09:04

## 2021-09-20 RX ADMIN — NEOSTIGMINE METHYLSULFATE 2 MG: 1 INJECTION, SOLUTION INTRAVENOUS at 11:15

## 2021-09-20 RX ADMIN — ACETAMINOPHEN 1000 MG: 500 TABLET ORAL at 21:05

## 2021-09-20 RX ADMIN — ROCURONIUM BROMIDE 40 MG: 10 SOLUTION INTRAVENOUS at 09:21

## 2021-09-20 RX ADMIN — TRANEXAMIC ACID 1 G: 100 INJECTION, SOLUTION INTRAVENOUS at 09:40

## 2021-09-20 RX ADMIN — HYDROMORPHONE HYDROCHLORIDE 0.5 MG: 2 INJECTION, SOLUTION INTRAMUSCULAR; INTRAVENOUS; SUBCUTANEOUS at 09:55

## 2021-09-20 RX ADMIN — LIDOCAINE HYDROCHLORIDE 80 MG: 20 INJECTION, SOLUTION EPIDURAL; INFILTRATION; INTRACAUDAL; PERINEURAL at 09:08

## 2021-09-20 RX ADMIN — HYDROMORPHONE HYDROCHLORIDE 0.5 MG: 2 INJECTION, SOLUTION INTRAMUSCULAR; INTRAVENOUS; SUBCUTANEOUS at 11:29

## 2021-09-20 RX ADMIN — FENTANYL CITRATE 50 MCG: 50 INJECTION, SOLUTION INTRAMUSCULAR; INTRAVENOUS at 09:49

## 2021-09-20 RX ADMIN — ASPIRIN 325 MG: 325 TABLET, COATED ORAL at 15:43

## 2021-09-20 RX ADMIN — SODIUM CHLORIDE, POTASSIUM CHLORIDE, SODIUM LACTATE AND CALCIUM CHLORIDE: 600; 310; 30; 20 INJECTION, SOLUTION INTRAVENOUS at 09:04

## 2021-09-20 RX ADMIN — GLYCOPYRROLATE 0.4 MG: 0.2 INJECTION, SOLUTION INTRAMUSCULAR; INTRAVENOUS at 11:15

## 2021-09-20 RX ADMIN — OXYCODONE 10 MG: 5 TABLET ORAL at 16:56

## 2021-09-20 RX ADMIN — DOXAZOSIN 2 MG: 2 TABLET ORAL at 21:06

## 2021-09-20 NOTE — PERIOP NOTES
TRANSFER - OUT REPORT:    Verbal report given to Jordana Arreola RN(name) on [employer]  being transferred to 57(unit) for routine post - op       Report consisted of patients Situation, Background, Assessment and   Recommendations(SBAR). Time Pre op antibiotic given:0925  Anesthesia Stop time: 8721  Peralta Present on Transfer to floor:no  Order for Peralta on Chart:no  Discharge Prescriptions with Chart:no    Information from the following report(s) SBAR, OR Summary, Procedure Summary, Intake/Output, MAR, Recent Results, Med Rec Status and Cardiac Rhythm nsr was reviewed with the receiving nurse. Opportunity for questions and clarification was provided. Is the patient on 02? YES       L/Min 2       Other nc    Is the patient on a monitor? NO    Is the nurse transporting with the patient? NO    Surgical Waiting Area notified of patient's transfer from PACU? YES      The following personal items collected during your admission accompanied patient upon transfer:   Dental Appliance: Dental Appliances: At home  Vision:    Hearing Aid:    Jewelry: Jewelry: None  Clothing: Clothing:  (clothing to PACU)  Other Valuables:  Other Valuables: None  Valuables sent to safe:

## 2021-09-20 NOTE — OP NOTES
OPERATIVE REPORT  RIGHT TOTAL HIP REPLACEMENT (ANTERIOR APPROACH)    NAME: Lisa Stephenson  MRN: [id number]  :  1955  AGE: 78 y.o. DATE OF SURGERY:  2021    PREOPERATIVE DIAGNOSIS: Severe degenerative joint disease, right hip. POSTOPERATIVE DIAGNOSIS: Severe degenerative joint disease, right hip. PROCEDURES PERFORMED: Right total hip replacement - Anterior approach    SURGEON: Kathryn Nina MD.    ASSISTANT: Jose Juan Mckeon PA-C    ANESTHESIA: General    ESTIMATED BLOOD LOSS: 125 mL. DRAINS: None. COMPLICATIONS: None. SPECIMENS REMOVED: None. PRE-OP ANTIBIOTIC: Ancef 2 gram    IMPLANT:   Implant Name Type Inv. Item Serial No.  Lot No. LRB No. Used Action   g7 acetabular shell 4 hole 54mm, f     [employer] [id number] Right 1 Implanted   BONE SCREW 6.5X30 SELF-TAP - SNA  BONE SCREW 6.5X30 SELF-TAP NA [employer]WD [id number] Right 1 Implanted   SCR BNE ST 6.5X40MM -- TRILOGY - SNA  SCR BNE ST 6.5X40MM -- TRILOGY NA [employer]WD [id number] Right 1 Implanted   G7 VIT E NEUTRAL LNR 36MM F - SNA  G7 VIT E NEUTRAL LNR 36MM F NA [employer]WD [id number] Right 1 Implanted   STEM FEM STD OFFSET 6 HIP CLLRD HA AVENIR COMPLETE - SNA  STEM FEM STD OFFSET 6 HIP CLLRD HA AVENIR COMPLETE NA [employer] [id number] Right 1 Implanted   HEAD FEM CER DELTA +7 36MM -- BIOLOX - SNA  HEAD FEM CER DELTA +7 36MM -- BIOLOX NA [employer] [id number] Right 1 Implanted       INDICATIONS: 78 yrs male  with severe DJD of the right hip. The patient's right hip has been progressive in terms of symptoms. The patient now has severe activity limitation. The patient has continued with conservative management without adequate relief or improvement of functional limitations. We discussed options and he wished to proceed with right total hip replacement.  The patient has continued with conservative management without adequate relief or improvement of functional limitations. DESCRIPTION OF PROCEDURE: Anesthetic was initiated. Preoperative dose of IV Ancef was given. Peralta catheter was not placed. The right side was confirmed as the operative side, prepped and draped in the usual sterile fashion. Skin was covered with Ioban occlusive dressing. Direct anterior exposure was made to the patient's hip through the sartorius tensor interval. Anterior hip vasculature was cauterized. Retractors were taken out to observe for bleeding and there was none. The capsule was identified, opened and T'd distally. The femoral neck was osteotomized. Femoral head was removed from the acetabulum, which was exposed and soft tissues were removed. The acetabulum was progressively reamed to 52 and a 53 trial shell was impacted with good press-fit. This was removed and a 54 shell was impacted in the acetabulum in 40 degrees of abduction in an anatomic-type anteversion. Bone spurs were removed and 6.5 screws x2 were placed. The polyethylene liner was placed. Femur was positioned and elevated from the wound. The medullary canal was entered. Flexible reamers were not utilized as the patient did not have a narrowed femoral canal, broached to a size 6. Calcar planed and then trialed. A 36 +7 hip ball was the most appropriate for leg length and tension with a standard offset stem. The hip was dislocated. The anterior greater trochanter was trimmed down to enhance flexion, rotation and stability. The trial was removed and the real stem was impacted. The real hip ball was placed. The hip was reduced. After copious irrigation, the capsule was closed with #2 Vicryl sutures. I irrigated the skin, subcutaneous and deep wound. I closed the fascia of the tensor fascia calixto with #2 Vicryl sutures. Skin and subcutaneous were irrigated. Soft tissues were infiltrated with local anesthetic. Skin and subcutaneous were closed in a standard fashion. Sterile dressing was applied. There were no complications. No specimen was sent. The procedure was a RIGHT TOTAL HIP REPLACEMENT using a Miguel total hip construct. The patient was transferred to the recovery room in stable condition. Malinda Pierre PA-C was critical throughout the case to assist with positioning, retraction and closure. There were no other available residents or surgical assistants to assist during this procedure.     Margi Sutherland MD

## 2021-09-20 NOTE — ANESTHESIA POSTPROCEDURE EVALUATION
Procedure(s):  RIGHT TOTAL HIP ARTHROPLASTY ANTERIOR APPROACH. general    Anesthesia Post Evaluation        Patient location during evaluation: PACU  Patient participation: complete - patient participated  Level of consciousness: awake and alert  Pain management: adequate  Airway patency: patent  Anesthetic complications: no  Cardiovascular status: acceptable  Respiratory status: acceptable  Hydration status: acceptable  Comments: I have seen and evaluated the patient and is ready for discharge. Manisha Payton MD    Post anesthesia nausea and vomiting:  none      INITIAL Post-op Vital signs:   Vitals Value Taken Time   /61 09/20/21 1128   Temp 36.7 °C (98.1 °F) 09/20/21 1128   Pulse 63 09/20/21 1130   Resp 16 09/20/21 1130   SpO2 97 % 09/20/21 1130   Vitals shown include unvalidated device data.

## 2021-09-20 NOTE — PROGRESS NOTES
Occupational Therapy     Orders received, chart review completed. Note patient POD #0 s/p RIGHT TOTAL HIP ARTHROPLASTY. OT will follow up tomorrow for evaluation. Recommend OOB to chair three times a day for meals, self-completion of ADLs as able and medically stable. Thank you.

## 2021-09-20 NOTE — PROGRESS NOTES
Problem: Mobility Impaired (Adult and Pediatric)  Goal: *Acute Goals and Plan of Care (Insert Text)  Description: FUNCTIONAL STATUS PRIOR TO ADMISSION: Patient was independent without use of DME.    HOME SUPPORT PRIOR TO ADMISSION: The patient lived alone - family members available to assist at discharge. Physical Therapy Goals  Initiated 9/20/2021    1. Patient will move from supine to sit and sit to supine  and scoot up and down in bed with modified independence within 4 days. 2. Patient will perform sit to stand with modified independence within 4 days. 3. Patient will ambulate with modified independence for > 150 feet with the least restrictive device within 4 days. 4. Patient will ascend/descend 4 stairs with one handrail(s) with modified independence within 4 days. 5. Patient will perform THR home exercise program per protocol with modified independence within 4 days. PHYSICAL THERAPY EVALUATION  Patient: Latanya Craig (89 y.o. male)  Date: 9/20/2021  Primary Diagnosis: Primary osteoarthritis of right hip [M16.11]  Primary localized osteoarthritis of right hip [M16.11]  Procedure(s) (LRB):  RIGHT TOTAL HIP ARTHROPLASTY ANTERIOR APPROACH (Right) Day of Surgery   Precautions:   WBAT    ASSESSMENT  Based on the objective data described below, the patient presents POD#0 right TKA with pain right hip, decreased AROM/strength and function right leg, decreased activity tolerance, decline in functional mobility and impaired standing balance/gait with use of RW. Pt did well mobilizing - amb short distance with RW - stable VS.  Anticipate pt will be ready for discharge from PT standpoint after am session. Current Level of Function Impacting Discharge (mobility/balance): Standby guard to [id number] for functional mobility    Functional Outcome Measure: The patient scored 55/100 on the Barthel Index outcome measure .       Other factors to consider for discharge: Pt to have assistance at discharge during day - will be alone at night     Patient will benefit from skilled therapy intervention to address the above noted impairments. PLAN :  Recommendations and Planned Interventions: bed mobility training, transfer training, gait training, therapeutic exercises, patient and family training/education, and therapeutic activities      Frequency/Duration: Patient will be followed by physical therapy:  twice daily to address goals. Recommendation for discharge: (in order for the patient to meet his/her long term goals)  Physical therapy at least 2 days/week in the home     This discharge recommendation:  Has been made in collaboration with the attending provider and/or case management    IF patient discharges home will need the following DME: patient owns DME required for discharge         SUBJECTIVE:   Patient stated Edis Frankel will have help during the day but then I will be on my own at night.     OBJECTIVE DATA SUMMARY:   HISTORY:    Past Medical History:   Diagnosis Date    Arthritis     Asthma     Diabetes ([facility] [address]     Hypertension     MSSA (methicillin susceptible Staphylococcus aureus) 09/13/2021    arya     Past Surgical History:   Procedure Laterality Date    HX GI      COLONOSCOPY       Personal factors and/or comorbidities impacting plan of care: as above    Home Situation  Home Environment: Trailer/mobile home  # Steps to Enter: 2  Rails to Enter: No  One/Two Story Residence: One story  Living Alone: Yes  Support Systems: Other Family Member(s)  Patient Expects to be Discharged to[de-identified] Trailer/mobile home  Current DME Used/Available at Home: Cane, straight, Walker, rolling  Tub or Shower Type: Shower    EXAMINATION/PRESENTATION/DECISION MAKING:   Critical Behavior:  Neurologic State: Alert  Orientation Level: Oriented X4  Cognition: Appropriate decision making, Appropriate for age attention/concentration, Appropriate safety awareness, Follows commands  Safety/Judgement: Awareness of environment, Good awareness of safety precautions  Range Of Motion:  AROM: Within functional limits (except right leg)                       Strength:    Strength: Within functional limits (except right leg)                    Tone & Sensation:   Tone: Normal              Sensation: Intact               Coordination:  Coordination: Within functional limits  Functional Mobility:  Bed Mobility:     Supine to Sit: Stand-by assistance  Sit to Supine: Stand-by assistance (with use of gait belt to lift right leg)  Scooting: Stand-by assistance  Transfers:  Sit to Stand: Contact guard assistance  Stand to Sit: Contact guard assistance                       Balance:   Sitting: Intact; Without support  Standing: Impaired; Without support  Standing - Static: Good;Constant support  Standing - Dynamic : Good;Constant support  Ambulation/Gait Training:  Distance (ft): 50 Feet (ft)  Assistive Device: Walker, rolling;Gait belt  Ambulation - Level of Assistance: Contact guard assistance        Gait Abnormalities: Antalgic;Decreased step clearance  Right Side Weight Bearing: As tolerated  Left Side Weight Bearing: Full  Base of Support: Center of gravity altered;Shift to left  Stance: Right decreased  Speed/Astrid: Slow  Step Length: Right shortened;Left shortened  Swing Pattern: Right asymmetrical             Therapeutic Exercises:   Pt instructed and performed ankle pumps, quad sets and heel slides - to be performed 5 -10 reps once hr when awake as tolerated. Patient demonstrated proper use of incentive spirometer - to be utilized once hr when awake - x 10. Functional Measure:  Barthel Index:    Bathin  Bladder: 10  Bowels: 10  Groomin  Dressin  Feeding: 10  Mobility: 0  Stairs: 0  Toilet Use: 5  Transfer (Bed to Chair and Back): 10  Total: 55/100       The Barthel ADL Index: Guidelines  1. The index should be used as a record of what a patient does, not as a record of what a patient could do.   2. The main aim is to establish degree of independence from any help, physical or verbal, however minor and for whatever reason. 3. The need for supervision renders the patient not independent. 4. A patient's performance should be established using the best available evidence. Asking the patient, friends/relatives and nurses are the usual sources, but direct observation and common sense are also important. However direct testing is not needed. 5. Usually the patient's performance over the preceding 24-48 hours is important, but occasionally longer periods will be relevant. 6. Middle categories imply that the patient supplies over 50 per cent of the effort. 7. Use of aids to be independent is allowed. Sabra Bailon., BarthelMADDIE. ([id number]). Functional evaluation: the Barthel Index. [address] [facility] (14)2. Sonia Kang levi DANA Grady, Veronica Lemus., [facility] [place], [address] [place] [address] Ave (1999). Measuring the change indisability after inpatient rehabilitation; comparison of the responsiveness of the Barthel Index and Functional [place] Measure. Journal of Neurology, Neurosurgery, and Psychiatry, 66(4), 554-621. Tiffanie Rowley, N.ELVIN.A, LORA Steen, & Valeria Ruiz, MCLARA. (2004.) Assessment of post-stroke quality of life in cost-effectiveness studies: The usefulness of the Barthel Index and the EuroQoL-5D.  Quality of Life Research, 15, 780-11           Physical Therapy Evaluation Charge Determination   History Examination Presentation Decision-Making   LOW Complexity : Zero comorbidities / personal factors that will impact the outcome / POC LOW Complexity : 1-2 Standardized tests and measures addressing body structure, function, activity limitation and / or participation in recreation  LOW Complexity : Stable, uncomplicated  LOW Complexity : FOTO score of       Based on the above components, the patient evaluation is determined to be of the following complexity level: LOW     Pain Rating:  Right hip at rest 0/10; following amb right hip 2/10    Activity Tolerance:   Good POD#0 mobilized with stable VS    After treatment patient left in no apparent distress:   Supine in bed, Call bell within reach, Caregiver / family present, and ice applied to right hip    COMMUNICATION/EDUCATION:   The patients plan of care was discussed with: Registered nurse. Fall prevention education was provided and the patient/caregiver indicated understanding., Patient/family have participated as able in goal setting and plan of care. , and Patient/family agree to work toward stated goals and plan of care.     Thank you for this referral.  Ayaka Martinez, PT   Time Calculation: 40 mins

## 2021-09-20 NOTE — H&P
Date of Surgery Update:  Fouzia Nath was seen and examined. History and physical has been reviewed. The patient has been examined.  There have been no significant clinical changes since the completion of the originally dated History and Physical.    Signed By: Lizzie Lyn MD     September 20, 2021 12:48 PM

## 2021-09-20 NOTE — PROGRESS NOTES
Primary Nurse Chikis Hardy, RN and Alli Barba RN performed a dual skin assessment on this patient No impairment noted  Darian score is 20

## 2021-09-20 NOTE — PROGRESS NOTES
Family Update provided to the pt's friend:  Dariela ([phone], who is waiting in the Surgical Waiting Area.

## 2021-09-20 NOTE — PROGRESS NOTES
Patient refused humalog sliding scale insulin. Patient educated on risks associated with elevated blood sugar and healing process. Patient still refused and stated they have not had insulin for over three years.

## 2021-09-20 NOTE — ANESTHESIA PREPROCEDURE EVALUATION
Relevant Problems   No relevant active problems       Anesthetic History   No history of anesthetic complications            Review of Systems / Medical History  Patient summary reviewed, nursing notes reviewed and pertinent labs reviewed    Pulmonary  Within defined limits          Asthma        Neuro/Psych   Within defined limits           Cardiovascular  Within defined limits  Hypertension                   GI/Hepatic/Renal  Within defined limits              Endo/Other  Within defined limits  Diabetes    Arthritis     Other Findings                   Anesthetic Plan    ASA: 2  Anesthesia type: general          Induction: Intravenous  Anesthetic plan and risks discussed with: Patient

## 2021-09-21 VITALS
RESPIRATION RATE: 16 BRPM | WEIGHT: 182.98 LBS | SYSTOLIC BLOOD PRESSURE: 133 MMHG | HEART RATE: 68 BPM | TEMPERATURE: 97.8 F | HEIGHT: 69 IN | BODY MASS INDEX: 27.1 KG/M2 | OXYGEN SATURATION: 93 % | DIASTOLIC BLOOD PRESSURE: 76 MMHG

## 2021-09-21 LAB
ANION GAP SERPL CALC-SCNC: 6 MMOL/L (ref 5–15)
BUN SERPL-MCNC: 19 MG/DL (ref 6–20)
BUN/CREAT SERPL: 16 (ref 12–20)
CALCIUM SERPL-MCNC: 8.2 MG/DL (ref 8.5–10.1)
CHLORIDE SERPL-SCNC: 104 MMOL/L (ref 97–108)
CO2 SERPL-SCNC: 23 MMOL/L (ref 21–32)
CREAT SERPL-MCNC: 1.17 MG/DL (ref 0.7–1.3)
GLUCOSE BLD STRIP.AUTO-MCNC: 165 MG/DL (ref 65–117)
GLUCOSE SERPL-MCNC: 138 MG/DL (ref 65–100)
HGB BLD-MCNC: 12.6 G/DL (ref 12.1–17)
POTASSIUM SERPL-SCNC: 4 MMOL/L (ref 3.5–5.1)
SERVICE CMNT-IMP: ABNORMAL
SODIUM SERPL-SCNC: 133 MMOL/L (ref 136–145)

## 2021-09-21 PROCEDURE — 74011000250 HC RX REV CODE- 250: Performed by: PHYSICIAN ASSISTANT

## 2021-09-21 PROCEDURE — 94664 DEMO&/EVAL PT USE INHALER: CPT

## 2021-09-21 PROCEDURE — 74011250636 HC RX REV CODE- 250/636: Performed by: PHYSICIAN ASSISTANT

## 2021-09-21 PROCEDURE — 97116 GAIT TRAINING THERAPY: CPT

## 2021-09-21 PROCEDURE — 85018 HEMOGLOBIN: CPT

## 2021-09-21 PROCEDURE — 80048 BASIC METABOLIC PNL TOTAL CA: CPT

## 2021-09-21 PROCEDURE — 74011250637 HC RX REV CODE- 250/637: Performed by: PHYSICIAN ASSISTANT

## 2021-09-21 PROCEDURE — 74011250637 HC RX REV CODE- 250/637: Performed by: ORTHOPAEDIC SURGERY

## 2021-09-21 PROCEDURE — 97165 OT EVAL LOW COMPLEX 30 MIN: CPT

## 2021-09-21 PROCEDURE — 36415 COLL VENOUS BLD VENIPUNCTURE: CPT

## 2021-09-21 PROCEDURE — 97535 SELF CARE MNGMENT TRAINING: CPT

## 2021-09-21 PROCEDURE — 94640 AIRWAY INHALATION TREATMENT: CPT

## 2021-09-21 PROCEDURE — 82962 GLUCOSE BLOOD TEST: CPT

## 2021-09-21 RX ORDER — AMOXICILLIN 250 MG
1 CAPSULE ORAL 2 TIMES DAILY
Qty: 40 TABLET | Refills: 0 | Status: SHIPPED | OUTPATIENT
Start: 2021-09-21

## 2021-09-21 RX ORDER — ASPIRIN 325 MG
325 TABLET, DELAYED RELEASE (ENTERIC COATED) ORAL EVERY 12 HOURS
Qty: 60 TABLET | Refills: 0 | Status: SHIPPED | OUTPATIENT
Start: 2021-09-21

## 2021-09-21 RX ORDER — OXYCODONE HYDROCHLORIDE 5 MG/1
5 TABLET ORAL
Qty: 42 TABLET | Refills: 0 | Status: SHIPPED | OUTPATIENT
Start: 2021-09-21 | End: 2021-09-28

## 2021-09-21 RX ADMIN — AMLODIPINE BESYLATE 5 MG: 5 TABLET ORAL at 09:55

## 2021-09-21 RX ADMIN — OXYCODONE 5 MG: 5 TABLET ORAL at 00:54

## 2021-09-21 RX ADMIN — ACETAMINOPHEN 1000 MG: 500 TABLET ORAL at 04:36

## 2021-09-21 RX ADMIN — ASPIRIN 325 MG: 325 TABLET, COATED ORAL at 09:54

## 2021-09-21 RX ADMIN — POLYETHYLENE GLYCOL 3350 17 G: 17 POWDER, FOR SOLUTION ORAL at 09:53

## 2021-09-21 RX ADMIN — IPRATROPIUM BROMIDE AND ALBUTEROL SULFATE 3 ML: .5; 3 SOLUTION RESPIRATORY (INHALATION) at 07:14

## 2021-09-21 RX ADMIN — OXYCODONE 10 MG: 5 TABLET ORAL at 09:59

## 2021-09-21 RX ADMIN — WATER 2 G: 1 INJECTION INTRAMUSCULAR; INTRAVENOUS; SUBCUTANEOUS at 00:45

## 2021-09-21 RX ADMIN — ACETAMINOPHEN 1000 MG: 500 TABLET ORAL at 09:55

## 2021-09-21 RX ADMIN — DOCUSATE SODIUM 50 MG AND SENNOSIDES 8.6 MG 1 TABLET: 8.6; 5 TABLET, FILM COATED ORAL at 09:53

## 2021-09-21 RX ADMIN — HYDROCHLOROTHIAZIDE 12.5 MG: 25 TABLET ORAL at 09:53

## 2021-09-21 RX ADMIN — LISINOPRIL 10 MG: 10 TABLET ORAL at 09:54

## 2021-09-21 RX ADMIN — MUPIROCIN: 20 OINTMENT TOPICAL at 10:08

## 2021-09-21 RX ADMIN — CARVEDILOL 12.5 MG: 12.5 TABLET, FILM COATED ORAL at 09:54

## 2021-09-21 RX ADMIN — OXYCODONE 5 MG: 5 TABLET ORAL at 04:36

## 2021-09-21 NOTE — PROGRESS NOTES
Problem: Mobility Impaired (Adult and Pediatric)  Goal: *Acute Goals and Plan of Care (Insert Text)  Description: FUNCTIONAL STATUS PRIOR TO ADMISSION: Patient was independent without use of DME.    HOME SUPPORT PRIOR TO ADMISSION: The patient lived with wife but did not require assist.    Physical Therapy Goals  Initiated 9/20/2021    1. Patient will move from supine to sit and sit to supine  and scoot up and down in bed with modified independence within 4 days. 2. Patient will perform sit to stand with modified independence within 4 days. 3. Patient will ambulate with modified independence for > 150 feet with the least restrictive device within 4 days. 4. Patient will ascend/descend 4 stairs with one handrail(s) with modified independence within 4 days. 5. Patient will perform THR home exercise program per protocol with modified independence within 4 days. Outcome: Progressing Towards Goal  PHYSICAL THERAPY NOTE  Patient: Betzy Lynn (42 y.o. male)  Date: 9/21/2021  Primary Diagnosis: Primary osteoarthritis of right hip [M16.11]  Primary localized osteoarthritis of right hip [M16.11]  Procedure(s) (LRB):  RIGHT TOTAL HIP ARTHROPLASTY ANTERIOR APPROACH (Right) 1 Day Post-Op   Precautions:  WBAT    Betzy Lynn was seen for morning PT session. He is walking 120 feet with the RW and w/ CGA/SBA. His gait is steady w/the RW as he demonstrates a step through pattern w/antalgia noted; no LOB or LE buckling noted. Reports stiffness however very little pain overall w/ activity and WBing. Patient was instructed in stair management and able to negotiate 4 steps using both rails w/ CGA/SBA. Reviewed activity recommendations and restrictions with patient. D/c video completed by patient. Betzy Lynn is clear for discharge home w/ HHPT from a mobility standpoint and RN is aware. Pt remained seated EOB. RN present.      Morning session functional mobility:  Bed Mobility:     Supine to Sit:  (recevied EOB)  Sit to Supine: Stand-by assistance     Transfers:  Sit to Stand: Stand-by assistance  Stand to Sit: Stand-by assistance                       Balance:   Sitting: Intact  Standing: Intact; With support  Standing - Static: Constant support;Good  Standing - Dynamic : Constant support;Good  Ambulation/Gait Training:  Distance (ft): 120 Feet (ft)  Assistive Device: Gait belt;Walker, rolling  Ambulation - Level of Assistance: Stand-by assistance;Contact guard assistance        Gait Abnormalities: Antalgic;Decreased step clearance  Right Side Weight Bearing: As tolerated  Left Side Weight Bearing: Full  Base of Support: Center of gravity altered;Shift to left  Stance: Right decreased  Speed/Astrid: Pace decreased (<100 feet/min)  Step Length: Left shortened;Right shortened  Swing Pattern: Right asymmetrical              Stairs:  Number of Stairs Trained: 4  Stairs - Level of Assistance: Stand-by assistance;Contact guard assistance;Assist X1  Rail Use: Both    Thank you,  Lucia Hopper,PTA   Time Calculation: 14 mins

## 2021-09-21 NOTE — PROGRESS NOTES
Bedside and Verbal shift change report given to Modesta Banda and Morgan King RN (oncoming nurse) by Kusum Johnson RN (offgoing nurse). Report included the following information SBAR.

## 2021-09-21 NOTE — PROGRESS NOTES
Bedside and Verbal shift change report given to Dinh Ferrell RN (oncoming nurse) by Edy Wells RN (offgoing nurse). Report included the following information SBAR, OR Summary, Procedure Summary and MAR.

## 2021-09-21 NOTE — PROGRESS NOTES
ORTHO PROGRESS NOTE    2021  Admit Date:   2021        Subjective:    Fredrick La is a 78 y.o. WHITE/NON- male who is 1 Day Post-Op Procedure(s):  RIGHT TOTAL HIP ARTHROPLASTY ANTERIOR APPROACH. The patient states moderate pain, otherwise without c/o at this time. The patient denies CP, SOB, N/V. Vital Signs:    No data found. Temp (24hrs), Av.7 °F (36.5 °C), Min:97.3 °F (36.3 °C), Max:98.1 °F (36.7 °C)      Pain Control:   Pain Assessment  Pain Scale 1: Numeric (0 - 10)  Pain Intensity 1: 3  Pain Location 1: Hip  Pain Orientation 1: Right  Pain Description 1: Aching  Pain Intervention(s) 1: Medication (see MAR)    LAB:    Recent Labs     21  0050   HGB 12.6   *   K 4.0      CO2 23   BUN 19   CREA 1.17   *       Assessment & Physician's Comment:  A&O X3, NAD  Respirations unlabored  Abdomen S/NT  DF/EHL/PF intact  Distal pulses intact  Calves S/NT, SILT bilateral lower extremities  Dressing is clean, dry, and intact    Procedure:  Procedure(s):  RIGHT TOTAL HIP ARTHROPLASTY ANTERIOR APPROACH    Plan:  1) Pain Control: Adequate, continue current regimen. 2) Hemodynamics: Stable. Will continue to monitor. 3) Wound: Change dressing if saturated. 4) Activity: WBAT, mobilize with assist.  5) DVT Prophylaxis:  mg BID, SCD's, encourage ankle pump exercise, mobilize. 6) Disposition: Case management for discharge planning. Plan on home today depending upon progress mobilizing with PT. Will continue to follow progress closely.         Fatoumata Taylor PA-C

## 2021-09-21 NOTE — PROGRESS NOTES
OCCUPATIONAL THERAPY EVALUATION/DISCHARGE  Patient: Aniceto White (25 y.o. male)  Date: 9/21/2021  Primary Diagnosis: Primary osteoarthritis of right hip [M16.11]  Primary localized osteoarthritis of right hip [M16.11]  Procedure(s) (LRB):  RIGHT TOTAL HIP ARTHROPLASTY ANTERIOR APPROACH (Right) 1 Day Post-Op   Precautions:  WBAT, Fall    ASSESSMENT  Based on the objective data described below, the patient presents with decreased AROM & pain in RLE s/p RTHA. Pt completing most ADLs with supervision-Sky. Pt reports having all dressing AE & DME needed at home, with reports of having grab bars installed in his shower this week. Pt was educated on RW safety as pt was slightly impulsive during bathroom mobility & remaining within frame of walker, however, with verbal cues pt was compliant. Pt educated on home safety and energy conservation techniques & pt's questions were answered. Pt has friend support at home if needed. Due to patient's higher level of functioning, with all DME needs met & PT following for functional mobility deficits, further acute OT services are not needed at this time. However, [address] [place], [place] [address]S [place] is recommended for a home safety evaluation upon discharge home. Current Level of Function (ADLs/self-care): supervision-Sky    Functional Outcome Measure: The patient scored 70/100 on the Barthel outcome measure which is indicative of pt's primary challenges being functional mobility.       Other factors to consider for discharge: lives alone     PLAN :  Recommend with staff: OOB meals, to/from bathroom [id number], active ADL engagement    Recommendation for discharge: (in order for the patient to meet his/her long term goals)  Occupational therapy at least 2 days/week in the home     This discharge recommendation:  Has not yet been discussed the attending provider and/or case management    IF patient discharges home will need the following DME: patient owns DME required for discharge       SUBJECTIVE:   Patient stated I'm ready to roll.     OBJECTIVE DATA SUMMARY:   HISTORY:   Past Medical History:   Diagnosis Date    Arthritis     Asthma     Diabetes ([address]     Hypertension     MSSA (methicillin susceptible Staphylococcus aureus) 09/13/2021    arya     Past Surgical History:   Procedure Laterality Date    HX GI      COLONOSCOPY       Prior Level of Function/Environment/Context: independent in all ADLs/iADLs  Expanded or extensive additional review of patient history:   Home Situation  Home Environment: Trailer/mobile home  # Steps to Enter: 2  Rails to Enter: No  One/Two Story Residence: One story  Living Alone: Yes  Support Systems: Friend/Neighbor  Patient Expects to be Discharged to[de-identified] Trailer/mobile home  Current DME Used/Available at Home: [employer], bedside, Cane, straight, Walker, rolling  Tub or Shower Type: Shower    Hand dominance: Right    EXAMINATION OF PERFORMANCE DEFICITS:  Cognitive/Behavioral Status:  Neurologic State: Alert  Orientation Level: Oriented X4  Cognition: Impulsive; Follows commands; Appropriate for age attention/concentration  Perception: Appears intact  Perseveration: No perseveration noted  Safety/Judgement: Awareness of environment    Skin: normal    Edema: slight edema in R knee    Hearing: Auditory  Auditory Impairment: None    Vision/Perceptual:                           Acuity: Within Defined Limits         Range of Motion:  AROM: Within functional limits  PROM: Within functional limits                      Strength:  Strength: Within functional limits                Coordination:  Coordination: Within functional limits  Fine Motor Skills-Upper: Left Intact; Right Intact    Gross Motor Skills-Upper: Left Intact; Right Intact    Tone & Sensation:  Tone: Normal  Sensation: Intact                      Balance:  Sitting: Intact; Without support  Standing: Intact; With support  Standing - Static: Good  Standing - Dynamic : Good;Constant support    Functional Mobility and Transfers for ADLs:  Bed Mobility:  Supine to Sit:  (recevied EOB)  Sit to Supine: Stand-by assistance    Transfers:  Sit to Stand: Stand-by assistance  Stand to Sit: Stand-by assistance  Bathroom Mobility: Stand-by assistance    ADL Assessment:  Feeding: Independent    Oral Facial Hygiene/Grooming: Independent    Bathing: Supervision    Upper Body Dressing: Independent    Lower Body Dressing: Modified independent    Toileting: Independent                ADL Intervention and task modifications:   Pt completing grooming tasks in standing with supervision and education provided around RW safety. Pt completing LB dressing with Sky, using long handled shoe horn for shoe donning. Pt was left with all needs met & questions answered. Grooming  Grooming Assistance: Stand-by assistance  Position Performed: Standing  Washing Face: Independent  Washing Hands: Independent  Brushing Teeth: Independent                   Lower Body Dressing Assistance  Slip on Shoes with Back: Modified independent  Leg Crossed Method Used: No  Position Performed: Seated edge of bed         Cognitive Retraining  Safety/Judgement: Awareness of environment    Functional Measure:  Barthel Index:    Bathin  Bladder: 10  Bowels: 10  Groomin  Dressing: 10  Feeding: 10  Mobility: 0  Stairs: 0  Toilet Use: 10  Transfer (Bed to Chair and Back): 10  Total: 70/100        The Barthel ADL Index: Guidelines  1. The index should be used as a record of what a patient does, not as a record of what a patient could do. 2. The main aim is to establish degree of independence from any help, physical or verbal, however minor and for whatever reason. 3. The need for supervision renders the patient not independent. 4. A patient's performance should be established using the best available evidence. Asking the patient, friends/relatives and nurses are the usual sources, but direct observation and common sense are also important. However direct testing is not needed.   5. Usually the patient's performance over the preceding 24-48 hours is important, but occasionally longer periods will be relevant. 6. Middle categories imply that the patient supplies over 50 per cent of the effort. 7. Use of aids to be independent is allowed. Fannie Warren., Barthel, D.W. (1007). Functional evaluation: the Barthel Index. [address] [facility] (14)2. DANA Lim, Madison Gifford., Nena Mason., [place], [address] (1999). Measuring the change indisability after inpatient rehabilitation; comparison of the responsiveness of the Barthel Index and Functional [place] Measure. Journal of Neurology, Neurosurgery, and Psychiatry, 66(4), 554-622. Be Spangler, N.J.A, LORA Steen, & Mell Girard MCLARA. (2004.) Assessment of post-stroke quality of life in cost-effectiveness studies: The usefulness of the Barthel Index and the EuroQoL-5D. Quality of Life Research, 15, 996-35       Occupational Therapy Evaluation Charge Determination   History Examination Decision-Making   LOW Complexity : Brief history review  LOW Complexity : 1-3 performance deficits relating to physical, cognitive , or psychosocial skils that result in activity limitations and / or participation restrictions  LOW Complexity : No comorbidities that affect functional and no verbal or physical assistance needed to complete eval tasks       Based on the above components, the patient evaluation is determined to be of the following complexity level: LOW   Pain Rating:  No c/o pain    Activity Tolerance:   Good    After treatment patient left in no apparent distress:    Call bell within reach and seated EOB    COMMUNICATION/EDUCATION:   The patients plan of care was discussed with: Physical therapist, Registered nurse and Case management. Thank you for this referral.  Addison Garcia     Regarding student involvement in patient care:  A student participated in this treatment session.  Per CMS Medicare statements and AOTA guidelines I certify that the following was true:  1. I was present and directly observed the entire session. 2. I made all skilled judgments and clinical decisions regarding care. 3. I am the practitioner responsible for assessment, treatment, and documentation.   Adam Helton, OT

## 2021-09-21 NOTE — NURSE NAVIGATOR
[address]  [facility] Orthopaedic Pathway Handoff     FROM:                                TO: At [facility]                                                      ([address] [facility] or Facility name)  Gina Avery 54  [address] [address]  Dept: [address]: [phone]                                      Room#:  155/03                                                       Nurse Navigator:  Titus Feldman RN         SITUATION      ASAScore: ASA 2 - Patient with mild systemic disease with no functional limitations    Admitted:  9/20/2021  Hospital Day: 2      Attending Provider:  No att. providers found     Consultations:  None    PCP:  Denisa Rey [place]   [phone]     Admitting Dx:  Primary osteoarthritis of right hip [M16.11]  Primary localized osteoarthritis of right hip [M16.11]       Active Problems:    Primary localized osteoarthritis of right hip (9/20/2021)      1 Day Post-Op of   Procedure(s):  RIGHT TOTAL HIP ARTHROPLASTY ANTERIOR APPROACH   BY: Linda Smith MD             ON: 9/20/2021                  Code Status: Prior             Advance Directive? Not Received (Send w/patient)     Isolation:  There are currently no Active Isolations       MDRO: No current active infections    BACKGROUND     Allergies:  No Known Allergies    Past Medical History:   Diagnosis Date    Arthritis     Asthma     Diabetes ([facility] [address]     Hypertension     MSSA (methicillin susceptible Staphylococcus aureus) 09/13/2021    arya       Past Surgical History:   Procedure Laterality Date    HX GI      COLONOSCOPY       Prior to Admission Medications   Prescriptions Last Dose Informant Patient Reported? Taking? amLODIPine (NORVASC) 5 mg tablet 9/20/2021 at 0600  Yes Yes   Sig: Take 5 mg by mouth daily. aspirin delayed-release 81 mg tablet 9/19/2021 at Unknown time  Yes No   Sig: Take 81 mg by mouth daily.    carvediloL (COREG) 25 mg tablet   Yes No Sig: Take 25 mg by mouth nightly. carvediloL (Coreg) 12.5 mg tablet 9/20/2021 at 0600  Yes Yes   Sig: Take 12.5 mg by mouth daily. doxazosin (CARDURA) 2 mg tablet 9/19/2021 at Unknown time  Yes Yes   Sig: Take 2 mg by mouth nightly. ipratropium-albuteroL (Combivent Respimat)  mcg/actuation inhaler   Yes No   Sig: Take 1 Puff by inhalation daily. AND AS NEEDED   levothyroxine (SYNTHROID) 50 mcg tablet 9/19/2021 at Unknown time  Yes Yes   Sig: Take 50 mcg by mouth nightly. linaGLIPtin (Tradjenta) 5 mg tablet 9/19/2021 at Unknown time  Yes Yes   Sig: Take 5 mg by mouth nightly. lisinopril-hydroCHLOROthiazide (PRINZIDE, ZESTORETIC) 20-25 mg per tablet   Yes Yes   Sig: Take 1 Tablet by mouth nightly. Takes 1/2 tab QAM and 1 tab QPM   lisinopril-hydroCHLOROthiazide (PRINZIDE, ZESTORETIC) 20-25 mg per tablet   Yes Yes   Sig: Take 0.5 Tablets by mouth daily. Takes 1/2 tab QAM and 1 tab QPM   metFORMIN (GLUCOPHAGE) 500 mg tablet 9/18/2021  Yes No   Sig: Take 1,000 mg by mouth nightly. mupirocin (BACTROBAN) 2 % ointment 9/19/2021 at Unknown time  No Yes   Sig: by Both Nostrils route two (2) times a day for 7 days. Apply 0.25 g (small pea-sized amount) to both nostrils twice a day for seven days. Facility-Administered Medications: None       Vaccinations:    Immunization History   Administered Date(s) Administered    COVID-19, PFIZER, MRNA, LNP-S, PF, 30MCG/0.3ML DOSE 02/27/2021, 03/20/2021         ASSESSMENT   Age: 78 y.o. Gender: male        Height: Height: 5' 9\" (175.3 cm)                    Weight:Weight: 83 kg (182 lb 15.7 oz)     Patient Vitals for the past 8 hrs:   Temp Pulse Resp BP SpO2   09/21/21 0950 97.8 °F (36.6 °C) 68 16 133/76 93 %            Active Orders   There are no active orders of the following types: Diet.        Orientation: Orientation Level: Oriented X4    Active Lines/Drains:  (Peg Tube / [place] / CL or S/L?):no    Urinary Status: Voiding      Last BM:       Skin Integrity: Incision (comment) (R hip)             Mobility: Slightly limited   Weight Bearing Status: WBAT (Weight Bearing as Tolerated)      Gait Training  Assistive Device: Gait belt, Walker, rolling  Ambulation - Level of Assistance: Stand-by assistance, Contact guard assistance  Distance (ft): 120 Feet (ft)  Stairs - Level of Assistance: Stand-by assistance, Contact guard assistance, Assist X1  Number of Stairs Trained: 4  Rail Use: Both     On Anticoagulation? YES  Aspirin                                         Pain Medications given:  oxycodone                                   Lab Results   Component Value Date/Time    Glucose 138 (H) 09/21/2021 12:50 AM    Hemoglobin A1c 6.2 (H) 09/13/2021 11:34 AM    INR 1.0 09/13/2021 11:34 AM    HGB 12.6 09/21/2021 12:50 AM    HGB 15.5 09/13/2021 11:34 AM       Readmission Risks:  Score:         RECOMMENDATION     See After Visit Summary (AVS) for:  · Discharge instructions  · After [address]   · Medication Reconciliation          [facility] Orthopaedic Nurse Navigator  KANCHAN Aguila, RN-BC       Office  [phone]  Cell      [phone]  Fax      [phone]  [email or web address]             . Andrew

## 2021-09-21 NOTE — DISCHARGE INSTRUCTIONS
Discharge Instructions Hip Replacement  Dr. Eliud Vela      Patient Name  Lewis Magana  Date of procedure  9/20/2021    Procedure  Procedure(s):  RIGHT TOTAL HIP ARTHROPLASTY ANTERIOR APPROACH  Surgeon  Surgeon(s) and Role:     * Sweetie Beck MD - Primary     * Florina Robertson MD - Fellow  Date of discharge: 9/21/2021  PCP: Maurilio Peterson, DO    Follow up care   Follow up visit with Dr. Eliud Vela in 3-4 weeks. Call [phone]  to make an appointment    Activity at home   AVOID sudden and extreme movement of your hip (surgical leg)   Take a short walk every hour; except at night when sleeping   Do your Home Exercise Program 3 times every day    After exercising lie down and elevate your leg on pillows for 15-30 minutes to decrease swelling   Refer to your patient notebook for more information    Bathing and caring for your incision   You may take a shower with your waterproof dressing on your hip.  The waterproof dressing is to stay on your hip for 7 days.  On the 7th day have someone gently peel the dressing off by lifting the edge and stretching it to break the seal.   You may then leave your incision open to air unless you see drainage from your hip.  If you have staple closing your incision they will be removed by home health 14 days after your surgery. Preventing blood clots   Take Aspirin 325 mg twice daily with meals for 3 weeks, then reduce dose to once daily for 3 weeks, then discontinue.  Call Dr. Eliud Vela if you have side effects of blood thinning medication: bleeding, bruising, upset stomach, or diarrhea.     Call Dr. Eliud Vela for signs of a blood clot in your leg: calf pain, tenderness, redness, swelling of lower leg    Preventing lung congestion   Use your incentive spirometer 4 times a day; do 10 repetitions each time   Remember to keep the small blue ball between the two arrows when taking a slow, deep breath           Pain Management   Get up and walk a short distance to relieve pain and stiffness.  Place ice wrap on your hip except when you are walking. The gel ice packs should be changed about every 4 hours   Elevate your leg on pillows for 15-30 minutes    Take Tylenol 650mg (take two 325mg tablets) every 6 hours for pain.  If needed, take a narcotic pain pill every 4-6 hours as prescribed.  Take all medications with a small amount of food.  As your pain decreases, take the narcotics less often or take ½ of a pill   Call Dr. Margarita Carrillo if you have side effects from your narcotic pain medication: itching, drowsiness, dizziness, upset stomach, dry mouth, constipation or if you medication is not relieving your pain. Diet after surgery   You may resume your normal diet. Include vegetables, fruit, whole grains, lean meats, and low-fat dairy products. Eat food high in fiber    Drink plenty of fluids, including 8 cups of water daily   Take Senokot-s twice a day to prevent constipation    Avoid after surgery   Do not take any over-the-counter medication for pain except Tylenol   Do not take more than 3000mg (3 grams) of Tylenol in 24 hours.  Do not drink alcoholic beverages   Do not smoke   Do not drive until seen for follow up appointment   Do not place frozen gel pack directly on your skin. It can cause frostbite.  Do not take a tub bath, swim or get in a hot tub for 6 weeks  Prevention of falls and safety at home   Set up an area where you can rest comfortably leaving space around furniture to allow you to walk with your walker   Keep stairs, hallways and bathrooms well lit; especially at night   Arrange for care for your pets   Keep your home free of clutter        Call Dr. Margarita Carrillo at [phone] for:   Pain that is not relieved by pain medication, ice and activity   Side effects of medications   Increased/spread of bruising   Warning signs of infection:  ? persistent fever greater than 100 degrees  ?  shaking or chills  ? increased redness, tenderness, swelling or drainage from incision  ? increased pain during activity or rest   Warning signs of a blood clot in your leg:  ? increased pain in your calf  ? tenderness or redness  ? increased swelling or knee, calf, ankle or foot    Call [phone] after 5pm or on a weekend.  The on call physician will return your phone call      Call your Primary Care Doctor for:    Concerns about your medical conditions such as diabetes, high blood pressure, asthma, congestive heart failure   Blood sugars greater than 180   Persistent headache or dizziness   Coughing or congestion   Constipation or diarrhea   Burning when you go to the bathroom   Abnormal heart rate (fast or slow)      Call 911 and go to the nearest hospital for:    Sudden increased shortness of breath   Sudden onset of chest pain   Difficulty breathing   Localized chest pain with coughing or taking a deep breath

## 2021-09-21 NOTE — PROGRESS NOTES
[place]: The patient plans to discharge home with [address] [address] and friend, Nguyen Holder to transport when stable for discharge. RUR: N/A    1. The patient is from home and lives alone. 2. Orthopedics, PT/OT following. 3. The patient plans to discharge home with home health. Observation notice provided in writing to patient and/or caregiver as well as verbal explanation of the policy. Patients who are in outpatient status also receive the Observation notice. Patient has received notice and or patient representative has received via secure email, fax, or certified mail based on patient representative's preference. Care Management Interventions  PCP Verified by CM: Yes  Palliative Care Criteria Met (RRAT>21 & CHF Dx)?: No  Mode of Transport at Discharge: Other (see comment)  Transition of Care Consult (CM Consult): [address]: No  Reason Outside [place]: Physician referred to specific agency  MyChart Signup: Yes  Discharge Durable Medical Equipment: No  Health Maintenance Reviewed: Yes  Physical Therapy Consult: Yes  Occupational Therapy Consult: Yes  Speech Therapy Consult: No  Support Systems: Friend/Neighbor  Confirm Follow Up Transport: Friends  The Plan for Transition of Care is Related to the Following Treatment Goals : The patient plans to discharge home with home health. The Patient and/or Patient Representative was Provided with a Choice of Provider and Agrees with the Discharge Plan?: Yes  Freedom of Choice List was Provided with Basic Dialogue that Supports the Patient's Individualized Plan of Care/Goals, Treatment Preferences and Shares the Quality Data Associated with the Providers?: Yes   Resource Information Provided?: No  Discharge Location  Discharge Placement: Home with home health     Reason for Admission:  Right Total Hip                     RUR Score:   N/A                  Plan for utilizing home health:      The Plan for Transition of Care is related to the following treatment goals: Home Health    The Patient and/or patient representative Deysi Manuel was provided with a choice of provider and agrees   with the discharge plan. [x] Yes [] No    Freedom of choice list was provided with basic dialogue that supports the patient's individualized plan of care/goals, treatment preferences and shares the quality data associated with the providers. [x] Yes [] No         PCP: First and Last name:  Zelda Quarles, phone: [phone]     Name of Practice:    Are you a current patient: Yes/No: Yes   Approximate date of last visit: Aug, 2021   Can you participate in a virtual visit with your PCP: Yes                    Current Advanced Directive/Advance Care Plan: Full Code      Healthcare Decision Maker:   Click here to complete [address] including selection of the Healthcare Decision Maker Relationship (ie \"Primary\")                             Transition of Care Plan:     CM met with the patient in room 576. The patient is alert and oriented x4. Confirmed demographics. The patient is independent with ADL's/IADL's, owns a rolling walker, drives a vehicle and uses [facility] on [address] [facility] [place] [address]. The patient lives alone and friend, Vinnie Solis ([phone]) plans to assist the patient at home along with transport him home when stable for discharge. CM following for discharge needs.     Christal Ellison RN/CRM

## 2021-09-22 ENCOUNTER — PATIENT OUTREACH (OUTPATIENT)
Dept: CASE MANAGEMENT | Age: 66
End: 2021-09-22

## 2022-01-20 RX ORDER — ASPIRIN 325 MG
TABLET, DELAYED RELEASE (ENTERIC COATED) ORAL
Qty: 60 TABLET | Refills: 0 | OUTPATIENT
Start: 2022-01-20

## 2022-03-19 PROBLEM — M16.11 PRIMARY LOCALIZED OSTEOARTHRITIS OF RIGHT HIP: Status: ACTIVE | Noted: 2021-09-20

## 2023-05-11 RX ORDER — LEVOTHYROXINE SODIUM 0.05 MG/1
TABLET ORAL
COMMUNITY

## 2023-05-11 RX ORDER — CARVEDILOL 12.5 MG/1
12.5 TABLET ORAL DAILY
COMMUNITY

## 2023-05-11 RX ORDER — DOXAZOSIN 2 MG/1
2 TABLET ORAL NIGHTLY
COMMUNITY

## 2023-05-11 RX ORDER — AMOXICILLIN 250 MG
1 CAPSULE ORAL 2 TIMES DAILY
COMMUNITY
Start: 2021-09-21

## 2023-05-11 RX ORDER — AMLODIPINE BESYLATE 5 MG/1
5 TABLET ORAL DAILY
COMMUNITY

## 2023-05-11 RX ORDER — CARVEDILOL 25 MG/1
25 TABLET ORAL NIGHTLY
COMMUNITY

## 2023-05-11 RX ORDER — ASPIRIN 325 MG
TABLET, DELAYED RELEASE (ENTERIC COATED) ORAL EVERY 12 HOURS
COMMUNITY
Start: 2021-09-21

## 2023-05-11 RX ORDER — LISINOPRIL AND HYDROCHLOROTHIAZIDE 25; 20 MG/1; MG/1
0.5 TABLET ORAL DAILY
COMMUNITY

## (undated) DEVICE — SUTURE MCRYL SZ 2-0 L36IN ABSRB UD L36MM CT-1 1/2 CIR Y945H

## (undated) DEVICE — SOLUTION IRRIG 1000ML STRL H2O USP PLAS POUR BTL

## (undated) DEVICE — GLOVE SURG SZ 8 L12IN FNGR THK94MIL STD WHT LTX FREE

## (undated) DEVICE — C-ARM: Brand: UNBRANDED

## (undated) DEVICE — CONTAINER,SPECIMEN,3OZ,OR STRL: Brand: MEDLINE

## (undated) DEVICE — GLOVE SURG SZ 65 L12IN FNGR THK79MIL GRN LTX FREE

## (undated) DEVICE — BLADE SAW W073XL276IN THK0031IN CUT THK0036IN REPL SAG

## (undated) DEVICE — SUTURE VCRL SZ 2 L54IN ABSRB UD L65MM TP-1 1/2 CIR J880T

## (undated) DEVICE — SOLUTION SURG PREP 26 CC PURPREP

## (undated) DEVICE — SCRUB DRY SURG EZ SCRUB BRUSH PREOPERATIVE GRN

## (undated) DEVICE — SUTURE ABSORBABLE BRAIDED 2-0 CT-1 27 IN UD VICRYL J259H

## (undated) DEVICE — 6619 2 PTNT ISO SYS INCISE AREA&LT;(&GT;&&LT;)&GT;P: Brand: STERI-DRAPE™ IOBAN™ 2

## (undated) DEVICE — BIT DRL L30MM DIA3.2MM DISP FOR G7 2 MOBILITY CONSTRUCT

## (undated) DEVICE — GLOVE ORTHO 8   MSG9480

## (undated) DEVICE — SOLUTION IRRIG 3000ML 0.9% SOD CHL USP UROMATIC PLAS CONT

## (undated) DEVICE — BIT DRL L20MM DIA3.2MM DISP FOR G7 2 MOBILITY CONSTRUCT

## (undated) DEVICE — SYR 20ML LL STRL LF --

## (undated) DEVICE — SOLUTION IV 50ML 0.9% SOD CHL

## (undated) DEVICE — SUTURE MCRYL SZ 4 0 L18IN ABSRB VLT PS 1 L24MM 3 8 CIR REV Y682H

## (undated) DEVICE — ADHESIVE SKIN CLOSURE 4X22 CM PREMIERPRO EXOFINFUSION DISP

## (undated) DEVICE — NEEDLE HYPO 18GA L1.5IN PNK S STL HUB POLYPR SHLD REG BVL

## (undated) DEVICE — 4-PORT MANIFOLD: Brand: NEPTUNE 2

## (undated) DEVICE — T4 HOOD

## (undated) DEVICE — PADDING CAST SPEC 6INX4YD COT --

## (undated) DEVICE — SUTURE VCRL SZ 2-0 L27IN ABSRB UD L26MM SH 1/2 CIR J417H

## (undated) DEVICE — TOTAL JOINT - SMH: Brand: MEDLINE INDUSTRIES, INC.

## (undated) DEVICE — DRAPE,U/ SHT,SPLIT,PLAS,STERIL: Brand: MEDLINE

## (undated) DEVICE — GLOVE SURG SZ 65 L12IN FNGR THK94MIL STD WHT LTX FREE

## (undated) DEVICE — BIT DRL L40MM DIA3.2MM DISP FOR G7 2 MOBILITY CONSTRUCT